# Patient Record
Sex: FEMALE | Race: WHITE | NOT HISPANIC OR LATINO | Employment: OTHER | URBAN - METROPOLITAN AREA
[De-identification: names, ages, dates, MRNs, and addresses within clinical notes are randomized per-mention and may not be internally consistent; named-entity substitution may affect disease eponyms.]

---

## 2021-10-06 PROBLEM — R01.1 HEART MURMUR: Status: ACTIVE | Noted: 2021-10-06

## 2021-10-06 PROBLEM — R60.9 EDEMA: Status: ACTIVE | Noted: 2021-10-06

## 2021-10-06 PROBLEM — E78.49 OTHER HYPERLIPIDEMIA: Status: ACTIVE | Noted: 2021-10-06

## 2021-10-06 PROBLEM — E11.9 TYPE 2 DIABETES MELLITUS WITHOUT COMPLICATION, WITHOUT LONG-TERM CURRENT USE OF INSULIN: Status: ACTIVE | Noted: 2021-10-06

## 2021-10-06 PROBLEM — R06.09 DYSPNEA ON EXERTION: Status: ACTIVE | Noted: 2021-10-06

## 2021-10-06 PROBLEM — I10 HYPERTENSION: Status: ACTIVE | Noted: 2021-10-06

## 2021-10-06 PROBLEM — E66.09 CLASS 2 OBESITY DUE TO EXCESS CALORIES WITHOUT SERIOUS COMORBIDITY WITH BODY MASS INDEX (BMI) OF 35.0 TO 35.9 IN ADULT: Status: ACTIVE | Noted: 2021-10-06

## 2021-10-06 PROBLEM — I10 HYPERTENSION: Status: RESOLVED | Noted: 2021-10-06 | Resolved: 2021-10-06

## 2022-01-24 PROBLEM — I34.0 NONRHEUMATIC MITRAL VALVE REGURGITATION: Status: ACTIVE | Noted: 2022-01-24

## 2022-01-24 PROBLEM — I35.1 NONRHEUMATIC AORTIC VALVE INSUFFICIENCY: Status: ACTIVE | Noted: 2022-01-24

## 2022-01-24 PROBLEM — I51.89 DIASTOLIC DYSFUNCTION: Status: ACTIVE | Noted: 2022-01-24

## 2022-01-24 PROBLEM — I70.0 AORTIC ATHEROSCLEROSIS: Status: ACTIVE | Noted: 2022-01-24

## 2022-01-24 PROBLEM — R01.1 HEART MURMUR: Status: RESOLVED | Noted: 2021-10-06 | Resolved: 2022-01-24

## 2022-01-24 PROBLEM — I51.7 LVH (LEFT VENTRICULAR HYPERTROPHY): Status: ACTIVE | Noted: 2022-01-24

## 2022-01-24 PROBLEM — I51.7 LEFT ATRIAL ENLARGEMENT: Status: ACTIVE | Noted: 2022-01-24

## 2022-01-24 PROBLEM — R94.39 ABNORMAL CARDIOVASCULAR STRESS TEST: Status: ACTIVE | Noted: 2022-01-24

## 2022-01-24 PROBLEM — I10 PRIMARY HYPERTENSION: Status: ACTIVE | Noted: 2022-01-24

## 2022-01-24 PROBLEM — I50.20 HEART FAILURE WITH REDUCED EJECTION FRACTION: Status: ACTIVE | Noted: 2022-01-24

## 2022-01-24 PROBLEM — I51.7 LEFT VENTRICULAR ENLARGEMENT: Status: ACTIVE | Noted: 2022-01-24

## 2022-01-24 PROBLEM — I42.9 CARDIOMYOPATHY: Status: ACTIVE | Noted: 2022-01-24

## 2022-01-24 PROBLEM — J98.4 PULMONARY INSUFFICIENCY: Status: ACTIVE | Noted: 2022-01-24

## 2022-05-25 ENCOUNTER — TELEPHONE (OUTPATIENT)
Dept: TRANSPLANT | Facility: CLINIC | Age: 67
End: 2022-05-25
Payer: COMMERCIAL

## 2022-05-25 DIAGNOSIS — I50.9 CONGESTIVE HEART FAILURE, UNSPECIFIED HF CHRONICITY, UNSPECIFIED HEART FAILURE TYPE: Primary | ICD-10-CM

## 2022-06-07 ENCOUNTER — PATIENT MESSAGE (OUTPATIENT)
Dept: TRANSPLANT | Facility: CLINIC | Age: 67
End: 2022-06-07
Payer: COMMERCIAL

## 2022-06-14 ENCOUNTER — HOSPITAL ENCOUNTER (OUTPATIENT)
Dept: PULMONOLOGY | Facility: CLINIC | Age: 67
Discharge: HOME | End: 2022-06-14
Payer: COMMERCIAL

## 2022-06-14 ENCOUNTER — LAB VISIT (OUTPATIENT)
Dept: LAB | Facility: HOSPITAL | Age: 67
End: 2022-06-14
Attending: INTERNAL MEDICINE
Payer: COMMERCIAL

## 2022-06-14 ENCOUNTER — OFFICE VISIT (OUTPATIENT)
Dept: TRANSPLANT | Facility: CLINIC | Age: 67
End: 2022-06-14
Payer: COMMERCIAL

## 2022-06-14 VITALS
HEIGHT: 64 IN | DIASTOLIC BLOOD PRESSURE: 63 MMHG | BODY MASS INDEX: 31.42 KG/M2 | SYSTOLIC BLOOD PRESSURE: 108 MMHG | WEIGHT: 184.06 LBS | HEART RATE: 93 BPM

## 2022-06-14 VITALS — BODY MASS INDEX: 31.58 KG/M2 | HEIGHT: 64 IN | WEIGHT: 185 LBS

## 2022-06-14 DIAGNOSIS — I70.0 AORTIC ATHEROSCLEROSIS: ICD-10-CM

## 2022-06-14 DIAGNOSIS — I50.9 CONGESTIVE HEART FAILURE, UNSPECIFIED HF CHRONICITY, UNSPECIFIED HEART FAILURE TYPE: ICD-10-CM

## 2022-06-14 DIAGNOSIS — I50.42 CHRONIC COMBINED SYSTOLIC (CONGESTIVE) AND DIASTOLIC (CONGESTIVE) HEART FAILURE: Primary | ICD-10-CM

## 2022-06-14 DIAGNOSIS — E66.09 CLASS 1 OBESITY DUE TO EXCESS CALORIES WITHOUT SERIOUS COMORBIDITY WITH BODY MASS INDEX (BMI) OF 31.0 TO 31.9 IN ADULT: ICD-10-CM

## 2022-06-14 DIAGNOSIS — E11.9 TYPE 2 DIABETES MELLITUS WITHOUT COMPLICATION, WITHOUT LONG-TERM CURRENT USE OF INSULIN: ICD-10-CM

## 2022-06-14 DIAGNOSIS — I25.5 ISCHEMIC CARDIOMYOPATHY: ICD-10-CM

## 2022-06-14 DIAGNOSIS — E78.49 OTHER HYPERLIPIDEMIA: ICD-10-CM

## 2022-06-14 PROBLEM — E66.9 CLASS 1 OBESITY IN ADULT: Status: ACTIVE | Noted: 2021-10-06

## 2022-06-14 LAB
ALBUMIN SERPL BCP-MCNC: 3.3 G/DL (ref 3.5–5.2)
ALP SERPL-CCNC: 71 U/L (ref 55–135)
ALT SERPL W/O P-5'-P-CCNC: 15 U/L (ref 10–44)
ANION GAP SERPL CALC-SCNC: 16 MMOL/L (ref 8–16)
AST SERPL-CCNC: 13 U/L (ref 10–40)
BASOPHILS # BLD AUTO: 0.06 K/UL (ref 0–0.2)
BASOPHILS NFR BLD: 0.6 % (ref 0–1.9)
BILIRUB SERPL-MCNC: 0.8 MG/DL (ref 0.1–1)
BNP SERPL-MCNC: 284 PG/ML (ref 0–99)
BUN SERPL-MCNC: 24 MG/DL (ref 8–23)
CALCIUM SERPL-MCNC: 9.4 MG/DL (ref 8.7–10.5)
CHLORIDE SERPL-SCNC: 100 MMOL/L (ref 95–110)
CO2 SERPL-SCNC: 22 MMOL/L (ref 23–29)
CREAT SERPL-MCNC: 1 MG/DL (ref 0.5–1.4)
DIFFERENTIAL METHOD: ABNORMAL
EOSINOPHIL # BLD AUTO: 0.1 K/UL (ref 0–0.5)
EOSINOPHIL NFR BLD: 1.3 % (ref 0–8)
ERYTHROCYTE [DISTWIDTH] IN BLOOD BY AUTOMATED COUNT: 13.2 % (ref 11.5–14.5)
EST. GFR  (AFRICAN AMERICAN): >60 ML/MIN/1.73 M^2
EST. GFR  (NON AFRICAN AMERICAN): 59.7 ML/MIN/1.73 M^2
GLUCOSE SERPL-MCNC: 152 MG/DL (ref 70–110)
HCT VFR BLD AUTO: 42.1 % (ref 37–48.5)
HGB BLD-MCNC: 14.3 G/DL (ref 12–16)
IMM GRANULOCYTES # BLD AUTO: 0.03 K/UL (ref 0–0.04)
IMM GRANULOCYTES NFR BLD AUTO: 0.3 % (ref 0–0.5)
LYMPHOCYTES # BLD AUTO: 1.8 K/UL (ref 1–4.8)
LYMPHOCYTES NFR BLD: 18.2 % (ref 18–48)
MCH RBC QN AUTO: 29.7 PG (ref 27–31)
MCHC RBC AUTO-ENTMCNC: 34 G/DL (ref 32–36)
MCV RBC AUTO: 88 FL (ref 82–98)
MONOCYTES # BLD AUTO: 0.4 K/UL (ref 0.3–1)
MONOCYTES NFR BLD: 4.5 % (ref 4–15)
NEUTROPHILS # BLD AUTO: 7.3 K/UL (ref 1.8–7.7)
NEUTROPHILS NFR BLD: 75.1 % (ref 38–73)
NRBC BLD-RTO: 0 /100 WBC
PLATELET # BLD AUTO: 293 K/UL (ref 150–450)
PMV BLD AUTO: 10.4 FL (ref 9.2–12.9)
POTASSIUM SERPL-SCNC: 3.4 MMOL/L (ref 3.5–5.1)
PROT SERPL-MCNC: 7.9 G/DL (ref 6–8.4)
RBC # BLD AUTO: 4.81 M/UL (ref 4–5.4)
SODIUM SERPL-SCNC: 138 MMOL/L (ref 136–145)
TSH SERPL DL<=0.005 MIU/L-ACNC: 1.11 UIU/ML (ref 0.4–4)
WBC # BLD AUTO: 9.75 K/UL (ref 3.9–12.7)

## 2022-06-14 PROCEDURE — 99999 PR PBB SHADOW E&M-EST. PATIENT-LVL V: CPT | Mod: PBBFAC,TXP,, | Performed by: INTERNAL MEDICINE

## 2022-06-14 PROCEDURE — 4010F PR ACE/ARB THEARPY RXD/TAKEN: ICD-10-PCS | Mod: CPTII,S$GLB,TXP, | Performed by: INTERNAL MEDICINE

## 2022-06-14 PROCEDURE — 1159F PR MEDICATION LIST DOCUMENTED IN MEDICAL RECORD: ICD-10-PCS | Mod: CPTII,S$GLB,TXP, | Performed by: INTERNAL MEDICINE

## 2022-06-14 PROCEDURE — 94618 PULMONARY STRESS TESTING: ICD-10-PCS | Mod: NTX,S$GLB,, | Performed by: INTERNAL MEDICINE

## 2022-06-14 PROCEDURE — 1159F MED LIST DOCD IN RCRD: CPT | Mod: CPTII,S$GLB,TXP, | Performed by: INTERNAL MEDICINE

## 2022-06-14 PROCEDURE — 83880 ASSAY OF NATRIURETIC PEPTIDE: CPT | Mod: TXP | Performed by: INTERNAL MEDICINE

## 2022-06-14 PROCEDURE — 3008F PR BODY MASS INDEX (BMI) DOCUMENTED: ICD-10-PCS | Mod: CPTII,S$GLB,TXP, | Performed by: INTERNAL MEDICINE

## 2022-06-14 PROCEDURE — 3051F PR MOST RECENT HEMOGLOBIN A1C LEVEL 7.0 - < 8.0%: ICD-10-PCS | Mod: CPTII,S$GLB,TXP, | Performed by: INTERNAL MEDICINE

## 2022-06-14 PROCEDURE — 85025 COMPLETE CBC W/AUTO DIFF WBC: CPT | Mod: TXP | Performed by: INTERNAL MEDICINE

## 2022-06-14 PROCEDURE — 4010F ACE/ARB THERAPY RXD/TAKEN: CPT | Mod: CPTII,S$GLB,TXP, | Performed by: INTERNAL MEDICINE

## 2022-06-14 PROCEDURE — 94618 PULMONARY STRESS TESTING: CPT | Mod: NTX,S$GLB,, | Performed by: INTERNAL MEDICINE

## 2022-06-14 PROCEDURE — 99204 PR OFFICE/OUTPT VISIT, NEW, LEVL IV, 45-59 MIN: ICD-10-PCS | Mod: S$GLB,TXP,, | Performed by: INTERNAL MEDICINE

## 2022-06-14 PROCEDURE — 99204 OFFICE O/P NEW MOD 45 MIN: CPT | Mod: S$GLB,TXP,, | Performed by: INTERNAL MEDICINE

## 2022-06-14 PROCEDURE — 3008F BODY MASS INDEX DOCD: CPT | Mod: CPTII,S$GLB,TXP, | Performed by: INTERNAL MEDICINE

## 2022-06-14 PROCEDURE — 80053 COMPREHEN METABOLIC PANEL: CPT | Mod: TXP | Performed by: INTERNAL MEDICINE

## 2022-06-14 PROCEDURE — 36415 COLL VENOUS BLD VENIPUNCTURE: CPT | Mod: TXP | Performed by: INTERNAL MEDICINE

## 2022-06-14 PROCEDURE — 3078F PR MOST RECENT DIASTOLIC BLOOD PRESSURE < 80 MM HG: ICD-10-PCS | Mod: CPTII,S$GLB,TXP, | Performed by: INTERNAL MEDICINE

## 2022-06-14 PROCEDURE — 1160F RVW MEDS BY RX/DR IN RCRD: CPT | Mod: CPTII,S$GLB,TXP, | Performed by: INTERNAL MEDICINE

## 2022-06-14 PROCEDURE — 3078F DIAST BP <80 MM HG: CPT | Mod: CPTII,S$GLB,TXP, | Performed by: INTERNAL MEDICINE

## 2022-06-14 PROCEDURE — 3074F PR MOST RECENT SYSTOLIC BLOOD PRESSURE < 130 MM HG: ICD-10-PCS | Mod: CPTII,S$GLB,TXP, | Performed by: INTERNAL MEDICINE

## 2022-06-14 PROCEDURE — 1160F PR REVIEW ALL MEDS BY PRESCRIBER/CLIN PHARMACIST DOCUMENTED: ICD-10-PCS | Mod: CPTII,S$GLB,TXP, | Performed by: INTERNAL MEDICINE

## 2022-06-14 PROCEDURE — 3074F SYST BP LT 130 MM HG: CPT | Mod: CPTII,S$GLB,TXP, | Performed by: INTERNAL MEDICINE

## 2022-06-14 PROCEDURE — 3051F HG A1C>EQUAL 7.0%<8.0%: CPT | Mod: CPTII,S$GLB,TXP, | Performed by: INTERNAL MEDICINE

## 2022-06-14 PROCEDURE — 99999 PR PBB SHADOW E&M-EST. PATIENT-LVL V: ICD-10-PCS | Mod: PBBFAC,TXP,, | Performed by: INTERNAL MEDICINE

## 2022-06-14 PROCEDURE — 84443 ASSAY THYROID STIM HORMONE: CPT | Mod: TXP | Performed by: INTERNAL MEDICINE

## 2022-06-14 RX ORDER — ALPRAZOLAM 0.25 MG/1
TABLET ORAL
COMMUNITY
End: 2022-11-15

## 2022-06-14 RX ORDER — SACUBITRIL AND VALSARTAN 24; 26 MG/1; MG/1
2 TABLET, FILM COATED ORAL 2 TIMES DAILY
Qty: 180 TABLET | Refills: 3 | Status: SHIPPED | OUTPATIENT
Start: 2022-06-14 | End: 2022-08-09

## 2022-06-14 NOTE — PROGRESS NOTES
ADVANCED HEART FAILURE AND TRANSPLANTATION CONSULTATION    REFERRING PHYSICIAN:  Maciel Forbes MD  [address] [place] [address]      CHIEF COMPLAINT:  Evaluation of management of heart failure and consideration of advanced cardiac therapies    HISTORY OF PRESENT ILLNESS:  Lili Gerardo is a 66 y.o. female with a past medical history remarkable for multivessel coronary artery disease, ischemic cardiomyopathy/HFrEF presenting to [facility] for consideration of advanced therapies.     Co-morbidities: HTN for over 20 years, DM2 for about 10 years not on insulin, hyperlipidemia, obesity BMI 31.6    States she was born with heart issues. Her mother had taken her to follow-ups at the age of 3 with concern for possible pulmonary stenosis. Saw Dr. Ellington locally at age 14 with no issues and no intervention or medications.     Diagnosed with coronary artery disease when she presented with an MI (appears to be inferior STEMI by ECG 1/29/2022 with symptoms of shortness of breath. She was admitted and underwent coronary angiography 2/3/2022 noting 30-40% LM, 90%prox LAD occluded mid with distal collateral filling, D1 80-90%, 95% CX, 95% OM1, RCA occluded proximally with large RV branch giving collaterals to mid-distal LAD. She did undergo balloon angioplasty to the CX, and YESICA to OM1 and unable to intervene on LAD noted as . She was told she was not a candidate for bypass surgery locally.     Since discharge, she has been following locally and placed on LifeVest. She is doing much better. She is accompanied by her daughter for today's visit. Notes no significant SOB but notes more fatigue and issues with knees. She is able to go upstairs to her granddaughter's two story house without SOB. Prior to admission she was struggling with SOB. Before presentation she had noticed a few months of shortness of breath. Notes occasional nausea. Denies orthopnea, PND, bendopnea, abdominal distension, early  satiety, lower extremity edema, chest discomfort, presyncope, palpitations, or syncope. Notes skin bruising but otherwise denies adverse bleeding, no hematochezia/melena/hematuria/hematemesis. Notes occasional cough that has been longstanding. Notes some seasonal allergies as well. Has associated rhinorrhea. Cough drops help with this.      6MWT today ambulated 244 meters with fatigue but did not stop and with no desaturations. Does cardiac rehab twice weekly. Follows with local Endocrinology. Does note checking daily weights but not told regarding fluid restriction. Follows salt restriction. She tries to take care of things around the house and baby sits her 3.5 year old granddaugther. She does basic housework.     Current diuretic regimen: Lasix 40 mg daily    GDMT: Toprol 75 mg BID, Entresto 24-26 mg BID, Aldactone 25 mg daily, Jardiance 25 mg daily    Cardiac history:  Angina: + SOB  Myocardial infarction: +  Revascularization: +  CVA/TIA: neg  VTE: neg  AF/arrhythmias: neg  Obesity: +  Hyperlipidemia: +  DM: +  PAD: no per OSH testing    Cardiac Data: 5/6/2022 OSH: LVEDD 53 mm, EF 20-25% with RWMA and apical/inferior akinesis, normal RV size and function, mild MR, trace AR  Transthoracic Echo:  Results for orders placed during the hospital encounter of 11/08/21  · The left ventricle is severely enlarged with severe eccentric hypertrophy and severely decreased systolic function.  · The estimated ejection fraction is about 25%.  · The quantitatively derived ejection fraction is 24%.  · There are segmental left ventricular wall motion abnormalities.  · Left ventricular diastolic dysfunction.  · Severe left atrial enlargement.  · Normal right ventricular size with normal right ventricular systolic function.  · Plaque present in the ascending aorta.  · Mild aortic regurgitation.  · Mild mitral regurgitation.  · The estimated PA systolic pressure is 16 mmHg.  · There is no pulmonary hypertension.  · Moderate pulmonic  regurgitation.  · Normal central venous pressure (3 mmHg).  · Trivial pericardial effusion.    Nuclear PET stress 12/10/2021:    Abnormal myocardial perfusion scan.    Evidence of edy-infarct ischemia in the apex, mid AW and the LW; also, definite very large myocardial injury (scar) by nuclear medicine imaging in the IS, IW, ILW, LW, mid-distal AW and the apex. Only the AS and the ALW are well perfused.    The EKG portion of this study is negative for ischemia.    The patient reported no chest pain during the stress test.    There were no arrhythmias during stress.    There are two significant perfusion abnormalities.    The gated perfusion images showed an ejection fraction of 22% post stress.    Defect #1 - There is a severe intensity, very large sized, fixed defect consistent with scar in the basal to apical inferior, inferolateral, inferoseptal and lateral wall(s).    Defect #2 - There is a large sized, severe intensity, fixed defect consistent with scar  in the mid to apical anterior wall and in the apex wall(s).    ECG 1/9/2022:  bpm with ST elevation inferiorly and ST depressions high lateral leads    Left Heart Catheterization: above    Right Heart Catheterization: none    Devices: LifeVest, plans for ICD in August 2022 after Medicare    PAST MEDICAL HISTORY:  Past Medical History:   Diagnosis Date    Diabetes     Heart attack     01/2022    Heart murmur     Hypertension        PAST SURGICAL HISTORY:  Past Surgical History:   Procedure Laterality Date    GALLBLADDER SURGERY         SOCIAL HISTORY  Marital Status: single, 3 kids all with HTN  Occupation: retired, used to work as   Alcohol: never  Tobacco: in teens briefly  Marijuana: never  IV Drug Use: neg  Cocaine/meth: neg  Hx preeclampsia: none besides gestational HTN for one pregnancy    FAMILY HISTORY  No family history of early CAD or HF  Father with DM2 with complications  Mother with open heart surgery years ago  possibly CABG  No SCD  First cousin with ICD for unclear reason    ALLERGIES:  Review of patient's allergies indicates:  No Known Allergies    MEDICATIONS  Current Outpatient Medications on File Prior to Visit   Medication Sig Dispense Refill    ALPRAZolam (XANAX) 0.25 MG tablet Take by mouth as needed for Anxiety.      aspirin (ECOTRIN) 81 MG EC tablet Adult Low Dose Aspirin 81 mg tablet,delayed release   Take 1 tablet every day by oral route.      atorvastatin (LIPITOR) 80 MG tablet Take 80 mg by mouth once daily.      blood sugar diagnostic Strp 1 strip by Misc.(Non-Drug; Combo Route) route once daily at 6am. True metrix test strips 100 strip 3    BRILINTA 90 mg tablet       empagliflozin (JARDIANCE) 25 mg tablet Take 1 tablet (25 mg total) by mouth once daily. 90 tablet 3    ENTRESTO 24-26 mg per tablet Take 1 tablet by mouth 2 (two) times daily. 180 tablet 3    ergocalciferol (ERGOCALCIFEROL) 50,000 unit Cap Take 1 capsule (50,000 Units total) by mouth every 7 days. 12 capsule 1    furosemide (LASIX) 40 MG tablet Take 40 mg by mouth once daily.      lancets 32 gauge Misc 1 lancet by Misc.(Non-Drug; Combo Route) route 3 (three) times daily. 200 each 3    metFORMIN (GLUCOPHAGE) 500 MG tablet Take 1 tablet (500 mg total) by mouth once daily. Take every evening 90 tablet 3    metoprolol succinate (TOPROL-XL) 50 MG 24 hr tablet TAKE 1 & 1/2 (ONE & ONE-HALF) TABLETS BY MOUTH TWICE DAILY      pantoprazole (PROTONIX) 40 MG tablet Take 40 mg by mouth once daily.      ranolazine (RANEXA) 500 MG Tb12 Take 500 mg by mouth 2 (two) times daily.      SITagliptin (JANUVIA) 100 MG Tab Take 1 tablet (100 mg total) by mouth once daily. 30 tablet 11    spironolactone (ALDACTONE) 25 MG tablet Take 1 tablet (25 mg total) by mouth once daily. 90 tablet 3    [DISCONTINUED] cloNIDine (CATAPRES) 0.2 MG tablet Take 0.5 tablets (0.1 mg total) by mouth every evening. (Patient not taking: Reported on 5/3/2022) 90 tablet 3  "   [DISCONTINUED] empagliflozin (JARDIANCE) 25 mg tablet Take 1 tablet (25 mg total) by mouth once daily. 30 tablet 1    [DISCONTINUED] lisinopriL (PRINIVIL,ZESTRIL) 20 MG tablet Take 1 tablet (20 mg total) by mouth every evening. 30 tablet 11    [DISCONTINUED] SITagliptin (JANUVIA) 100 MG Tab Take 1 tablet (100 mg total) by mouth once daily. 90 tablet 3     No current facility-administered medications on file prior to visit.       REVIEW OF SYSTEMS  Review of Systems   Constitutional: Negative for activity change, appetite change and fatigue.   Respiratory: Positive for shortness of breath. Negative for chest tightness.    Cardiovascular: Negative for chest pain and leg swelling.   Gastrointestinal: Negative for abdominal distention, blood in stool and nausea.   Genitourinary: Negative for difficulty urinating and hematuria.   Neurological: Negative for syncope and light-headedness.   Hematological: Does not bruise/bleed easily.   All other systems reviewed and are negative.      PHYSICAL EXAM:    Vitals:    06/14/22 1305 06/14/22 1308   BP: 119/68 108/63   BP Location: Left arm Left arm   Patient Position: Sitting Standing   BP Method: Medium (Automatic) Medium (Automatic)   Pulse: 86 93   Weight: 83.5 kg (184 lb 1.4 oz)    Height: 5' 4" (1.626 m)     Body mass index is 31.6 kg/m².    GENERAL: Alert, NAD   HEENT: Anicteric sclerae  NECK: JVP not visible above level of clavicle while sitting upright or reclining 45 degrees  CARDIOVASCULAR: Regular rate and rhythm. Normal S1, S2 no murmurs, rubs or gallops.  PULMONARY: Lungs clear to auscultation bilaterally  ABDOMEN: Soft, nontender, nondistended. No guarding, no rebound, no hepatomegaly  EXTREMITIES: Warm and well-perfused. No clubbing, cyanosis or edema. No pre-sacral edema  VASCULAR: 2+ bilateral radial pulses  NEUROLOGIC: No focal deficits    LABS:    BMP  Lab Results   Component Value Date     06/14/2022    K 3.4 (L) 06/14/2022     06/14/2022    " CO2 22 (L) 06/14/2022    BUN 24 (H) 06/14/2022    CREATININE 1.0 06/14/2022    CALCIUM 9.4 06/14/2022    ANIONGAP 16 06/14/2022    ESTGFRAFRICA >60.0 06/14/2022    EGFRNONAA 59.7 (A) 06/14/2022       Magnesium   Date Value Ref Range Status   01/24/2022 2.1 1.6 - 2.6 mg/dL Final       Lab Results   Component Value Date    WBC 9.75 06/14/2022    HGB 14.3 06/14/2022    HCT 42.1 06/14/2022    MCV 88 06/14/2022     06/14/2022         Lab Results   Component Value Date    INR 1.1 01/29/2022    INR 1.0 01/24/2022       BNP   Date Value Ref Range Status   06/14/2022 284 (H) 0 - 99 pg/mL Final     Comment:     Values of less than 100 pg/ml are consistent with non-CHF populations.   01/29/2022 1,067 (H) 0 - 99 pg/mL Final     Comment:     Values of less than 100 pg/ml are consistent with non-CHF populations.   01/24/2022 1,132 (H) 0 - 99 pg/mL Final     Comment:     Values of less than 100 pg/ml are consistent with non-CHF populations.         IMPRESSION:    NYHA Class II  ACC/AHA Stage C  Costa profile A    1. Chronic combined systolic (congestive) and diastolic (congestive) heart failure    2. Ischemic cardiomyopathy    3. Aortic atherosclerosis    4. Other hyperlipidemia    5. Type 2 diabetes mellitus without complication, without long-term current use of insulin    6. Class 1 obesity due to excess calories without serious comorbidity with body mass index (BMI) of 31.0 to 31.9 in adult        PLAN:    Would prefer to review cath films with interventional cardiology and cardiothoracic surgery as well as repeat viability testing (PET stress done prior to STEMI) for consideration of revascularization.     In interim, increase Entresto to 49-51 mg BID with repeat labs locally next week.    Recommend 2 gram sodium restriction and 1500 mL fluid restriction.  Encourage physical activity with graded exercise program.  Requested patient to weigh themselves daily, and to notify us if their weight increases by more than 3  lbs in 1 day or 5 lbs in 1 week.     Pt to call us with more shortness of breath, swelling or unexpected weight changes, fever, chills, bloody or black bowel movements, or other concerns.    F/U 1 month with labs    Electronically signed by:   Mahogany Arias   06/14/2022 11:52 AM

## 2022-06-14 NOTE — PATIENT INSTRUCTIONS
Recommend 2 gram sodium restriction and 1500cc fluid restriction.  Encourage physical activity with graded exercise program.  Requested patient to weigh themselves daily, and to notify us if their weight increases by more than 3 lbs in 1 day or 5 lbs in 1 week.      We will increase your Entresto to 49-51 mg twice daily so until your new prescription comes, take 2 tablets in the AM and 2 tablets in the PM. With this change, we will repeat blood work locally next week to follow your potassium and kidney function levels.

## 2022-06-14 NOTE — LETTER
June 14, 2022        Maciel Forbes  [address] [facility]  [place] [address]  Phone: [phone]  Fax: [phone]             [facility]  [address]  [facility] [address]  Phone: [phone]   Patient: Lili Gerardo   MR Number: [id number]   YOB: 1955   Date of Visit: 6/14/2022       Dear Dr. Maciel Forbes    Thank you for referring Lili Gerardo to me for evaluation. Attached you will find relevant portions of my assessment and plan of care.    If you have questions, please do not hesitate to call me. I look forward to following Lili Gerardo along with you.    Sincerely,    Mahogany Arias, DO    Enclosure    If you would like to receive this communication electronically, please contact [email or web address] or [phone] to request [employer] access.    [employer] is a tool which provides read-only access to select patient information with whom you have a relationship. Its easy to use and provides real time access to review your patients record including encounter summaries, notes, results, and demographic information.    If you feel you have received this communication in error or would no longer like to receive these types of communications, please e-mail [email or web address]

## 2022-06-17 NOTE — PROCEDURES
Lili Gerardo is a 66 y.o.  female patient, who presents for a 6 minute walk test ordered by DO Mario.  The diagnosis is Shortness of Breath.  The patient's BMI is 31.7 kg/m2.  Predicted distance (lower limit of normal) is 307.07 meters.      Test Results:    The test was completed without stopping. The total time walked was 360 seconds.  During walking, the patient reported:  Other (Comment) (Fatigue). The patient used no assistive devices  during testing.     6/14/2022---------Distance: 243.84 meters (800 feet)     O2 Sat % Supplemental Oxygen Heart Rate Blood Pressure Daina Scale   Pre-exercise  (Resting) 98 % Room Air 84 bpm 117/58 mmHg 0   During Exercise 98 % Room Air 97 bpm 143/80 mmHg 1   Post-exercise  (Recovery) 98 % Room Air  86 bpm 124/67 mmHg       Recovery Time: 128 seconds    Performing nurse/tech: Kiersten LONGO      PREVIOUS STUDY:   The patient has not had a previous study.      CLINICAL INTERPRETATION:  Six minute walk distance is 243.84 meters (800 feet) with very light dyspnea.  During exercise, there was no significant desaturation while breathing room air.  Both blood pressure and heart rate remained stable with walking.  The patient reported non-pulmonary symptoms during exercise.  Significant exercise impairment is likely due to subjective symptoms.  No previous study performed.  Based upon age and body mass index, exercise capacity is less than predicted.

## 2022-06-21 ENCOUNTER — TELEPHONE (OUTPATIENT)
Dept: TRANSPLANT | Facility: CLINIC | Age: 67
End: 2022-06-21
Payer: COMMERCIAL

## 2022-06-21 LAB
EXT BNP: 158
EXT BUN: 26
EXT CALCIUM: 9.2
EXT CHLORIDE: 105
EXT CREATININE: 0.86 MG/DL
EXT GLUCOSE: 151
EXT POTASSIUM: 3.9
EXT SODIUM: 138 MMOL/L

## 2022-06-21 NOTE — TELEPHONE ENCOUNTER
Received faxed labs dated 06/21/2022. results are consistent with recent trends.       Creat 0.86  K 3.9  BNP  158    Spoke with pt who reports she feels mostly well since increasing her Entresto with the exception of an increase in her cough. Weight has been stable, no edema noted.     Results and note routed to Dr Arias for review.

## 2022-08-09 ENCOUNTER — OFFICE VISIT (OUTPATIENT)
Dept: TRANSPLANT | Facility: CLINIC | Age: 67
End: 2022-08-09
Payer: MEDICARE

## 2022-08-09 ENCOUNTER — LAB VISIT (OUTPATIENT)
Dept: LAB | Facility: HOSPITAL | Age: 67
End: 2022-08-09
Attending: INTERNAL MEDICINE
Payer: MEDICARE

## 2022-08-09 VITALS
BODY MASS INDEX: 31.69 KG/M2 | DIASTOLIC BLOOD PRESSURE: 59 MMHG | HEART RATE: 78 BPM | HEIGHT: 64 IN | WEIGHT: 185.63 LBS | SYSTOLIC BLOOD PRESSURE: 110 MMHG

## 2022-08-09 DIAGNOSIS — I70.0 AORTIC ATHEROSCLEROSIS: ICD-10-CM

## 2022-08-09 DIAGNOSIS — E11.9 TYPE 2 DIABETES MELLITUS WITHOUT COMPLICATION, WITHOUT LONG-TERM CURRENT USE OF INSULIN: ICD-10-CM

## 2022-08-09 DIAGNOSIS — I25.5 ISCHEMIC CARDIOMYOPATHY: ICD-10-CM

## 2022-08-09 DIAGNOSIS — I10 PRIMARY HYPERTENSION: ICD-10-CM

## 2022-08-09 DIAGNOSIS — I25.5 ISCHEMIC CARDIOMYOPATHY: Primary | ICD-10-CM

## 2022-08-09 DIAGNOSIS — I50.42 CHRONIC COMBINED SYSTOLIC (CONGESTIVE) AND DIASTOLIC (CONGESTIVE) HEART FAILURE: ICD-10-CM

## 2022-08-09 DIAGNOSIS — I50.20 HEART FAILURE WITH REDUCED EJECTION FRACTION: ICD-10-CM

## 2022-08-09 DIAGNOSIS — R94.39 ABNORMAL CARDIOVASCULAR STRESS TEST: ICD-10-CM

## 2022-08-09 DIAGNOSIS — E78.49 OTHER HYPERLIPIDEMIA: ICD-10-CM

## 2022-08-09 DIAGNOSIS — E66.09 CLASS 1 OBESITY DUE TO EXCESS CALORIES WITHOUT SERIOUS COMORBIDITY WITH BODY MASS INDEX (BMI) OF 31.0 TO 31.9 IN ADULT: ICD-10-CM

## 2022-08-09 LAB
ALBUMIN SERPL BCP-MCNC: 3.3 G/DL (ref 3.5–5.2)
ALP SERPL-CCNC: 68 U/L (ref 55–135)
ALT SERPL W/O P-5'-P-CCNC: 14 U/L (ref 10–44)
ANION GAP SERPL CALC-SCNC: 12 MMOL/L (ref 8–16)
AST SERPL-CCNC: 12 U/L (ref 10–40)
BASOPHILS # BLD AUTO: 0.06 K/UL (ref 0–0.2)
BASOPHILS NFR BLD: 0.7 % (ref 0–1.9)
BILIRUB SERPL-MCNC: 0.6 MG/DL (ref 0.1–1)
BNP SERPL-MCNC: 148 PG/ML (ref 0–99)
BUN SERPL-MCNC: 28 MG/DL (ref 8–23)
CALCIUM SERPL-MCNC: 10.1 MG/DL (ref 8.7–10.5)
CHLORIDE SERPL-SCNC: 101 MMOL/L (ref 95–110)
CO2 SERPL-SCNC: 27 MMOL/L (ref 23–29)
CREAT SERPL-MCNC: 1 MG/DL (ref 0.5–1.4)
DIFFERENTIAL METHOD: NORMAL
EOSINOPHIL # BLD AUTO: 0.2 K/UL (ref 0–0.5)
EOSINOPHIL NFR BLD: 2 % (ref 0–8)
ERYTHROCYTE [DISTWIDTH] IN BLOOD BY AUTOMATED COUNT: 13 % (ref 11.5–14.5)
EST. GFR  (NO RACE VARIABLE): >60 ML/MIN/1.73 M^2
GLUCOSE SERPL-MCNC: 143 MG/DL (ref 70–110)
HCT VFR BLD AUTO: 42 % (ref 37–48.5)
HGB BLD-MCNC: 13.9 G/DL (ref 12–16)
IMM GRANULOCYTES # BLD AUTO: 0.03 K/UL (ref 0–0.04)
IMM GRANULOCYTES NFR BLD AUTO: 0.3 % (ref 0–0.5)
LYMPHOCYTES # BLD AUTO: 2.2 K/UL (ref 1–4.8)
LYMPHOCYTES NFR BLD: 25.2 % (ref 18–48)
MAGNESIUM SERPL-MCNC: 1.9 MG/DL (ref 1.6–2.6)
MCH RBC QN AUTO: 30 PG (ref 27–31)
MCHC RBC AUTO-ENTMCNC: 33.1 G/DL (ref 32–36)
MCV RBC AUTO: 91 FL (ref 82–98)
MONOCYTES # BLD AUTO: 0.6 K/UL (ref 0.3–1)
MONOCYTES NFR BLD: 6.6 % (ref 4–15)
NEUTROPHILS # BLD AUTO: 5.8 K/UL (ref 1.8–7.7)
NEUTROPHILS NFR BLD: 65.2 % (ref 38–73)
NRBC BLD-RTO: 0 /100 WBC
PLATELET # BLD AUTO: 267 K/UL (ref 150–450)
PMV BLD AUTO: 10.2 FL (ref 9.2–12.9)
POTASSIUM SERPL-SCNC: 3.8 MMOL/L (ref 3.5–5.1)
PROT SERPL-MCNC: 7.6 G/DL (ref 6–8.4)
RBC # BLD AUTO: 4.64 M/UL (ref 4–5.4)
SODIUM SERPL-SCNC: 140 MMOL/L (ref 136–145)
WBC # BLD AUTO: 8.84 K/UL (ref 3.9–12.7)

## 2022-08-09 PROCEDURE — 85025 COMPLETE CBC W/AUTO DIFF WBC: CPT | Mod: TXP | Performed by: INTERNAL MEDICINE

## 2022-08-09 PROCEDURE — 80053 COMPREHEN METABOLIC PANEL: CPT | Mod: TXP | Performed by: INTERNAL MEDICINE

## 2022-08-09 PROCEDURE — 99214 OFFICE O/P EST MOD 30 MIN: CPT | Mod: S$PBB,NTX,, | Performed by: INTERNAL MEDICINE

## 2022-08-09 PROCEDURE — 83880 ASSAY OF NATRIURETIC PEPTIDE: CPT | Mod: NTX | Performed by: INTERNAL MEDICINE

## 2022-08-09 PROCEDURE — 99214 OFFICE O/P EST MOD 30 MIN: CPT | Mod: PBBFAC,NTX | Performed by: INTERNAL MEDICINE

## 2022-08-09 PROCEDURE — 99214 PR OFFICE/OUTPT VISIT, EST, LEVL IV, 30-39 MIN: ICD-10-PCS | Mod: S$PBB,NTX,, | Performed by: INTERNAL MEDICINE

## 2022-08-09 PROCEDURE — 36415 COLL VENOUS BLD VENIPUNCTURE: CPT | Mod: TXP | Performed by: INTERNAL MEDICINE

## 2022-08-09 PROCEDURE — 99999 PR PBB SHADOW E&M-EST. PATIENT-LVL IV: ICD-10-PCS | Mod: PBBFAC,TXP,, | Performed by: INTERNAL MEDICINE

## 2022-08-09 PROCEDURE — 83735 ASSAY OF MAGNESIUM: CPT | Mod: TXP | Performed by: INTERNAL MEDICINE

## 2022-08-09 PROCEDURE — 99999 PR PBB SHADOW E&M-EST. PATIENT-LVL IV: CPT | Mod: PBBFAC,TXP,, | Performed by: INTERNAL MEDICINE

## 2022-08-09 RX ORDER — BUMETANIDE 0.5 MG/1
0.5 TABLET ORAL DAILY
Qty: 30 TABLET | Refills: 11 | Status: SHIPPED | OUTPATIENT
Start: 2022-08-09 | End: 2023-07-11

## 2022-08-09 RX ORDER — SACUBITRIL AND VALSARTAN 97; 103 MG/1; MG/1
1 TABLET, FILM COATED ORAL 2 TIMES DAILY
Qty: 90 TABLET | Refills: 3 | Status: SHIPPED | OUTPATIENT
Start: 2022-08-09 | End: 2023-06-08

## 2022-08-09 NOTE — PROGRESS NOTES
ADVANCED HEART FAILURE AND TRANSPLANTATION OFFICE VISIT    CHIEF COMPLAINT:  Evaluation of management of heart failure and consideration of advanced cardiac therapies    HISTORY OF PRESENT ILLNESS:  Lili Gerardo is a 67 y.o.  female with a past medical history remarkable for multivessel coronary artery disease, ischemic cardiomyopathy/HFrEF presenting to [facility] for follow-up    Co-morbidities: HTN for over 20 years, DM2 for about 10 years not on insulin, hyperlipidemia, obesity BMI 31.9    She established care with [facility] 6/14/2022 at which time we wanted to review outside cath films with interventional cardiology and cardiothoracic surgery as well as repeat viability testing (PET stress done prior to STEMI) for consideration of revascularization. The images are not uploaded and pending from [facility]. We also increased Entresto to 49-51 mg BID and she has been tolerating this well. Since then, she has been doing well. Continues going to cardiac rehab twice weekly with treadmill and other machines without SOB. Denies orthopnea, PND, bendopnea, abdominal distension, early satiety, nausea, lower extremity edema, chest discomfort, presyncope, palpitations, or syncope. Denies adverse bleeding, no hematochezia/melena/hematuria/hematemesis. Saw Endocrinologist recently and thought maybe was dehydrated.   Does not weight self daily but does at cardiac rehab. Weight at initial visit 83.5 kg and today 84.2 kg. Has not had HF hospitalizations.    Current diuretic regimen: Lasix 40 mg daily     GDMT: Toprol 75 mg BID, Entresto 49-51 mg BID, Aldactone 25 mg daily (but maybe taking 12.5 mg daily), Jardiance 25 mg daily  She is taking Ranexa 500 mg BID  Aspirin 81 mg daily and Brilinta 90 mg BID, Lipitor 80 mg qHS    Cardiac history:  States she was born with heart issues. Her mother had taken her to follow-ups at the age of 3 with concern for possible pulmonary stenosis. Saw Dr. Ellington locally at  age 14 with no issues and no intervention or medications. Diagnosed with coronary artery disease when she presented with an MI (appears to be inferior STEMI by ECG 1/29/2022 with symptoms of shortness of breath. She was admitted and underwent coronary angiography 2/3/2022 noting 30-40% LM, 90%prox LAD occluded mid with distal collateral filling, D1 80-90%, 95% CX, 95% OM1, RCA occluded proximally with large RV branch giving collaterals to mid-distal LAD. She did undergo balloon angioplasty to the CX, and YESICA to OM1 and unable to intervene on LAD noted as . She was told she was not a candidate for bypass surgery locally.     Since discharge, she has been following locally and placed on LifeVest. She is doing much better. She is accompanied by her daughter for today's visit. Notes no significant SOB but notes more fatigue and issues with knees. She is able to go upstairs to her granddaughter's two story house without SOB. Prior to admission she was struggling with SOB. Before presentation she had noticed a few months of shortness of breath.     6MWT at initial visit ambulated 244 meters with fatigue but did not stop and with no desaturations. Does cardiac rehab twice weekly. Follows with local Endocrinology. Does note checking daily weights but not told regarding fluid restriction. Follows salt restriction. She tries to take care of things around the house and baby sits her 3.5 year old granddaugther. She does basic housework.  Angina: + SOB  Myocardial infarction: +  Revascularization: +  CVA/TIA: neg  VTE: neg  AF/arrhythmias: neg  Obesity: +  Hyperlipidemia: +  DM: +  PAD: no per OSH testing    Cardiac Data: 5/6/2022 OSH: LVEDD 53 mm, EF 20-25% with RWMA and apical/inferior akinesis, normal RV size and function, mild MR, trace AR  Transthoracic Echo:  Results for orders placed during the hospital encounter of 11/08/21  · The left ventricle is severely enlarged with severe eccentric hypertrophy and severely  decreased systolic function.  · The estimated ejection fraction is about 25%.  · The quantitatively derived ejection fraction is 24%.  · There are segmental left ventricular wall motion abnormalities.  · Left ventricular diastolic dysfunction.  · Severe left atrial enlargement.  · Normal right ventricular size with normal right ventricular systolic function.  · Plaque present in the ascending aorta.  · Mild aortic regurgitation.  · Mild mitral regurgitation.  · The estimated PA systolic pressure is 16 mmHg.  · There is no pulmonary hypertension.  · Moderate pulmonic regurgitation.  · Normal central venous pressure (3 mmHg).  · Trivial pericardial effusion.    Nuclear PET stress 12/10/2021:    Abnormal myocardial perfusion scan.    Evidence of edy-infarct ischemia in the apex, mid AW and the LW; also, definite very large myocardial injury (scar) by nuclear medicine imaging in the IS, IW, ILW, LW, mid-distal AW and the apex. Only the AS and the ALW are well perfused.    The EKG portion of this study is negative for ischemia.    The patient reported no chest pain during the stress test.    There were no arrhythmias during stress.    There are two significant perfusion abnormalities.    The gated perfusion images showed an ejection fraction of 22% post stress.    Defect #1 - There is a severe intensity, very large sized, fixed defect consistent with scar in the basal to apical inferior, inferolateral, inferoseptal and lateral wall(s).    Defect #2 - There is a large sized, severe intensity, fixed defect consistent with scar  in the mid to apical anterior wall and in the apex wall(s).    ECG 1/9/2022:  bpm with ST elevation inferiorly and ST depressions high lateral leads    Left Heart Catheterization: above    Right Heart Catheterization: none    Devices: LifeVest, plans for ICD in August 2022 after Medicare    PAST MEDICAL HISTORY:  Past Medical History:   Diagnosis Date    Diabetes     Heart attack      01/2022    Heart murmur     Hypertension        PAST SURGICAL HISTORY:  Past Surgical History:   Procedure Laterality Date    GALLBLADDER SURGERY         SOCIAL HISTORY  Marital Status: single, 3 kids all with HTN  Occupation: retired, used to work as   Alcohol: never  Tobacco: in teens briefly  Marijuana: never  IV Drug Use: neg  Cocaine/meth: neg  Hx preeclampsia: none besides gestational HTN for one pregnancy    FAMILY HISTORY  No family history of early CAD or HF  Father with DM2 with complications  Mother with open heart surgery years ago possibly CABG  No SCD  First cousin with ICD for unclear reason    ALLERGIES:  Review of patient's allergies indicates:  No Known Allergies    MEDICATIONS  Current Outpatient Medications on File Prior to Visit   Medication Sig Dispense Refill    ALPRAZolam (XANAX) 0.25 MG tablet Take by mouth as needed for Anxiety.      aspirin (ECOTRIN) 81 MG EC tablet Adult Low Dose Aspirin 81 mg tablet,delayed release   Take 1 tablet every day by oral route.      atorvastatin (LIPITOR) 80 MG tablet Take 80 mg by mouth once daily.      blood sugar diagnostic Strp 1 strip by Misc.(Non-Drug; Combo Route) route once daily at 6am. True metrix test strips 100 strip 3    BRILINTA 90 mg tablet       empagliflozin (JARDIANCE) 25 mg tablet Take 1 tablet (25 mg total) by mouth once daily. 90 tablet 3    ENTRESTO 24-26 mg per tablet Take 2 tablets by mouth 2 (two) times daily. 180 tablet 3    furosemide (LASIX) 40 MG tablet Take 40 mg by mouth once daily.      lancets 32 gauge Misc 1 lancet by Misc.(Non-Drug; Combo Route) route 3 (three) times daily. 200 each 3    metFORMIN (GLUCOPHAGE) 500 MG tablet Take 1 tablet (500 mg total) by mouth once daily. Take every evening 90 tablet 3    metoprolol succinate (TOPROL-XL) 50 MG 24 hr tablet TAKE 1 & 1/2 (ONE & ONE-HALF) TABLETS BY MOUTH TWICE DAILY      pantoprazole (PROTONIX) 40 MG tablet Take 40 mg by mouth once daily.       "ranolazine (RANEXA) 500 MG Tb12 Take 500 mg by mouth 2 (two) times daily.      spironolactone (ALDACTONE) 25 MG tablet Take 1 tablet (25 mg total) by mouth once daily. 90 tablet 3     No current facility-administered medications on file prior to visit.       REVIEW OF SYSTEMS  Review of Systems   Constitutional: Negative for activity change, appetite change and fatigue.   Respiratory: Positive for shortness of breath. Negative for chest tightness.    Cardiovascular: Negative for chest pain and leg swelling.   Gastrointestinal: Negative for abdominal distention, blood in stool and nausea.   Genitourinary: Negative for difficulty urinating and hematuria.   Neurological: Negative for syncope and light-headedness.   Hematological: Does not bruise/bleed easily.   All other systems reviewed and are negative.      PHYSICAL EXAM:    Vitals:    08/09/22 0931 08/09/22 0933   BP: 121/60 (!) 110/59   BP Location: Left arm Right arm   Patient Position: Sitting Sitting   BP Method: Medium (Automatic) Medium (Automatic)   Pulse: 79 78   Weight: 84.2 kg (185 lb 10 oz)    Height: 5' 4" (1.626 m)     Body mass index is 31.86 kg/m².    GENERAL: Alert, NAD   HEENT: Anicteric sclerae  NECK: JVP visible above level of clavicle to low neck while sitting upright and to upper neck reclining 45 degrees  CARDIOVASCULAR: Regular rate and rhythm. Normal S1, S2 no murmurs, rubs or gallops.  PULMONARY: Lungs clear to auscultation bilaterally  ABDOMEN: Soft, nontender, nondistended. No guarding, no rebound, no hepatomegaly  EXTREMITIES: Warm and well-perfused. No clubbing, cyanosis or edema. No pre-sacral edema  VASCULAR: 2+ bilateral radial pulses  NEUROLOGIC: No focal deficits    LABS:    BMP  Lab Results   Component Value Date     08/01/2022    K 4.0 08/01/2022     08/01/2022    CO2 27 08/01/2022    BUN 26 (H) 08/01/2022    CREATININE 1.1 08/01/2022    CALCIUM 9.4 08/01/2022    ANIONGAP 12 08/01/2022    ESTGFRAFRICA >60.0 " 06/14/2022    EGFRNONAA 59.7 (A) 06/14/2022       Magnesium   Date Value Ref Range Status   01/24/2022 2.1 1.6 - 2.6 mg/dL Final       Lab Results   Component Value Date    WBC 9.75 06/14/2022    HGB 14.3 06/14/2022    HCT 42.1 06/14/2022    MCV 88 06/14/2022     06/14/2022         Lab Results   Component Value Date    INR 1.1 01/29/2022    INR 1.0 01/24/2022       BNP   Date Value Ref Range Status   06/14/2022 284 (H) 0 - 99 pg/mL Final     Comment:     Values of less than 100 pg/ml are consistent with non-CHF populations.   01/29/2022 1,067 (H) 0 - 99 pg/mL Final     Comment:     Values of less than 100 pg/ml are consistent with non-CHF populations.   01/24/2022 1,132 (H) 0 - 99 pg/mL Final     Comment:     Values of less than 100 pg/ml are consistent with non-CHF populations.         IMPRESSION:    NYHA Class II  ACC/AHA Stage C  Costa profile B    1. Ischemic cardiomyopathy    2. Heart failure with reduced ejection fraction    3. Chronic combined systolic (congestive) and diastolic (congestive) heart failure    4. Primary hypertension    5. Abnormal cardiovascular stress test    6. Other hyperlipidemia    7. Aortic atherosclerosis    8. Type 2 diabetes mellitus without complication, without long-term current use of insulin    9. Class 1 obesity due to excess calories without serious comorbidity with body mass index (BMI) of 31.0 to 31.9 in adult        PLAN:    Obtain cath films from [facility] for review. Obtain repeat PET stress. Pending labs today, plan to increase Entresto to  mg BID and take spironolactone 25 mg daily and change Lasix to Bumex 0.5 mg daily given hypervolemia by exam. Repeat labs locally next week.    Recommend 2 gram sodium restriction and 1500 mL fluid restriction.  Encourage physical activity with graded exercise program.  Requested patient to weigh themselves daily, and to notify us if their weight increases by more than 3 lbs in 1 day or 5 lbs in 1 week.     Pt  to call us with more shortness of breath, swelling or unexpected weight changes, fever, chills, bloody or black bowel movements, or other concerns.    F/U approximately 3 months after cath films received and PET stress completed    Electronically signed by:   Mahogany Arias   08/09/2022 11:52 AM

## 2022-08-09 NOTE — LETTER
August 9, 2022        Maciel Forbes  [address]  [place] [address]  Phone: [phone]  Fax: [phone]             [facility]  [address]  [facility] [address]  Phone: [phone]   Patient: Lili Gerardo   MR Number: [id number]   YOB: 1955   Date of Visit: 8/9/2022       Dear Dr. Maciel Forbes    Thank you for referring Lili Gerardo to me for evaluation. Attached you will find relevant portions of my assessment and plan of care.    If you have questions, please do not hesitate to call me. I look forward to following Lili Gerardo along with you.    Sincerely,    Mahogany Arias, DO    Enclosure    If you would like to receive this communication electronically, please contact [email or web address] or [phone] to request [employer] access.    [employer] is a tool which provides read-only access to select patient information with whom you have a relationship. Its easy to use and provides real time access to review your patients record including encounter summaries, notes, results, and demographic information.    If you feel you have received this communication in error or would no longer like to receive these types of communications, please e-mail [email or web address]

## 2022-08-18 ENCOUNTER — TELEPHONE (OUTPATIENT)
Dept: TRANSPLANT | Facility: CLINIC | Age: 67
End: 2022-08-18
Payer: COMMERCIAL

## 2022-08-18 NOTE — TELEPHONE ENCOUNTER
Pt scheduled for labs at [facility] on 8/16/22. Contacted facility, they informed me no labs from pt since June. Attempted to contact pt, no answer. Left vm.     Pt returned call, she is going to her PCP on tomorrow. Will get labs at PCP office.

## 2022-08-22 ENCOUNTER — TELEPHONE (OUTPATIENT)
Dept: TRANSPLANT | Facility: CLINIC | Age: 67
End: 2022-08-22
Payer: COMMERCIAL

## 2022-08-22 LAB
ALP ISOS SERPL LEV INH-CCNC: 58 U/L
BILIRUBIN TOTAL: 0.7
BNP: 125.2
EXT ALBUMIN: 4.3
EXT ALT: 19
EXT AST: 16
EXT BUN: 23
EXT CALCIUM: 9.8
EXT CHLORIDE: 103
EXT CREATININE: 1.02 MG/DL
EXT GLUCOSE: 163
EXT POTASSIUM: 4
EXT PROTEIN TOTAL: 11.8
EXT SODIUM: 138 MMOL/L

## 2022-08-22 NOTE — TELEPHONE ENCOUNTER
Received faxed labs from [facility] dated 8/19/22.   Results are consistent with recent trends.    Creat =1.02  K =4.0  BNP =125.2

## 2022-08-31 PROBLEM — I50.22 CHF (CONGESTIVE HEART FAILURE), NYHA CLASS II, CHRONIC, SYSTOLIC: Status: ACTIVE | Noted: 2022-06-14

## 2022-09-01 ENCOUNTER — TELEPHONE (OUTPATIENT)
Dept: TRANSPLANT | Facility: CLINIC | Age: 67
End: 2022-09-01
Payer: MEDICARE

## 2022-09-01 NOTE — TELEPHONE ENCOUNTER
On yesterday, Pt is s/p ICD placement at [facility]. Called to f/u. Phone went to , left message.    mild impairment

## 2022-10-14 ENCOUNTER — TELEPHONE (OUTPATIENT)
Dept: CARDIOLOGY | Facility: HOSPITAL | Age: 67
End: 2022-10-14
Payer: MEDICARE

## 2022-10-18 ENCOUNTER — HOSPITAL ENCOUNTER (OUTPATIENT)
Dept: CARDIOLOGY | Facility: HOSPITAL | Age: 67
Discharge: HOME | End: 2022-10-18
Attending: INTERNAL MEDICINE
Payer: MEDICARE

## 2022-10-18 VITALS
SYSTOLIC BLOOD PRESSURE: 109 MMHG | WEIGHT: 181 LBS | HEIGHT: 64 IN | HEART RATE: 67 BPM | BODY MASS INDEX: 30.9 KG/M2 | DIASTOLIC BLOOD PRESSURE: 73 MMHG

## 2022-10-18 DIAGNOSIS — I50.20 HEART FAILURE WITH REDUCED EJECTION FRACTION: ICD-10-CM

## 2022-10-18 DIAGNOSIS — I25.5 ISCHEMIC CARDIOMYOPATHY: ICD-10-CM

## 2022-10-18 LAB
CFR FLOW - ANTERIOR: 1.77
CFR FLOW - INFERIOR: 0.93
CFR FLOW - LATERAL: 1.6
CFR FLOW - MAX: 2.95
CFR FLOW - MIN: 0.63
CFR FLOW - SEPTAL: 1.17
CFR FLOW - WHOLE HEART: 1.37
CV PHARM DOSE: 0.4 MG
CV STRESS BASE HR: 67 BPM
DIASTOLIC BLOOD PRESSURE: 73 MMHG
EJECTION FRACTION- HIGH: 65 %
END DIASTOLIC INDEX-HIGH: 153 ML/M2
END DIASTOLIC INDEX-LOW: 93 ML/M2
END SYSTOLIC INDEX-HIGH: 71 ML/M2
END SYSTOLIC INDEX-LOW: 31 ML/M2
NUC REST DIASTOLIC VOLUME INDEX: 120
NUC REST EJECTION FRACTION: 40
NUC REST SYSTOLIC VOLUME INDEX: 72
NUC STRESS DIASTOLIC VOLUME INDEX: 163
NUC STRESS EJECTION FRACTION: 36 %
NUC STRESS SYSTOLIC VOLUME INDEX: 105
OHS CV CPX 85 PERCENT MAX PREDICTED HEART RATE MALE: 126
OHS CV CPX MAX PREDICTED HEART RATE: 149
OHS CV CPX PATIENT IS FEMALE: 1
OHS CV CPX PATIENT IS MALE: 0
OHS CV CPX PEAK DIASTOLIC BLOOD PRESSURE: 50 MMHG
OHS CV CPX PEAK HEAR RATE: 75 BPM
OHS CV CPX PEAK RATE PRESSURE PRODUCT: 8925
OHS CV CPX PEAK SYSTOLIC BLOOD PRESSURE: 119 MMHG
OHS CV CPX PERCENT MAX PREDICTED HEART RATE ACHIEVED: 50
OHS CV CPX RATE PRESSURE PRODUCT PRESENTING: 7303
PERFUSION DEFECT 1 SIZE IN %: 5 %
PERFUSION DEFECT 2 SIZE IN %: 2 %
PERFUSION DEFECT SIZE WORSENS % 1: 20 %
PERFUSION DEFECT WORSENS SIZE IN % 2: 37 %
REST FLOW - ANTERIOR: 0.49 CC/MIN/G
REST FLOW - INFERIOR: 0.42 CC/MIN/G
REST FLOW - LATERAL: 0.41 CC/MIN/G
REST FLOW - MAX: 0.64 CC/MIN/G
REST FLOW - MIN: 0.24 CC/MIN/G
REST FLOW - SEPTAL: 0.4 CC/MIN/G
REST FLOW - WHOLE HEART: 0.43 CC/MIN/G
RETIRED EF AND QEF - SEE NOTES: 53 %
STRESS FLOW - ANTERIOR: 0.87 CC/MIN/G
STRESS FLOW - INFERIOR: 0.39 CC/MIN/G
STRESS FLOW - LATERAL: 0.69 CC/MIN/G
STRESS FLOW - MAX: 1.42 CC/MIN/G
STRESS FLOW - MIN: 0.18 CC/MIN/G
STRESS FLOW - SEPTAL: 0.48 CC/MIN/G
STRESS FLOW - WHOLE HEART: 0.61 CC/MIN/G
SYSTOLIC BLOOD PRESSURE: 109 MMHG

## 2022-10-18 PROCEDURE — 63600175 PHARM REV CODE 636 W HCPCS: Mod: TXP | Performed by: INTERNAL MEDICINE

## 2022-10-18 PROCEDURE — 93018 CV STRESS TEST I&R ONLY: CPT | Mod: NTX,,, | Performed by: INTERNAL MEDICINE

## 2022-10-18 PROCEDURE — 93016 CARDIAC PET SCAN STRESS (CUPID ONLY): ICD-10-PCS | Mod: NTX,,, | Performed by: INTERNAL MEDICINE

## 2022-10-18 PROCEDURE — 78431 MYOCRD IMG PET RST&STRS CT: CPT | Mod: 26,NTX,, | Performed by: INTERNAL MEDICINE

## 2022-10-18 PROCEDURE — 93016 CV STRESS TEST SUPVJ ONLY: CPT | Mod: NTX,,, | Performed by: INTERNAL MEDICINE

## 2022-10-18 PROCEDURE — 78434 CARDIAC PET SCAN STRESS (CUPID ONLY): ICD-10-PCS | Mod: 26,NTX,, | Performed by: INTERNAL MEDICINE

## 2022-10-18 PROCEDURE — 78434 AQMBF PET REST & RX STRESS: CPT | Mod: TXP

## 2022-10-18 PROCEDURE — 78434 AQMBF PET REST & RX STRESS: CPT | Mod: 26,NTX,, | Performed by: INTERNAL MEDICINE

## 2022-10-18 PROCEDURE — 78431 CARDIAC PET SCAN STRESS (CUPID ONLY): ICD-10-PCS | Mod: 26,NTX,, | Performed by: INTERNAL MEDICINE

## 2022-10-18 PROCEDURE — 93018 CARDIAC PET SCAN STRESS (CUPID ONLY): ICD-10-PCS | Mod: NTX,,, | Performed by: INTERNAL MEDICINE

## 2022-10-18 RX ORDER — REGADENOSON 0.08 MG/ML
0.4 INJECTION, SOLUTION INTRAVENOUS ONCE
Status: COMPLETED | OUTPATIENT
Start: 2022-10-18 | End: 2022-10-18

## 2022-10-18 RX ADMIN — REGADENOSON 0.4 MG: 0.08 INJECTION, SOLUTION INTRAVENOUS at 11:10

## 2022-10-19 ENCOUNTER — TELEPHONE (OUTPATIENT)
Dept: TRANSPLANT | Facility: CLINIC | Age: 67
End: 2022-10-19
Payer: MEDICARE

## 2022-10-19 ENCOUNTER — TELEPHONE (OUTPATIENT)
Dept: CARDIOLOGY | Facility: CLINIC | Age: 67
End: 2022-10-19
Payer: MEDICARE

## 2022-10-19 DIAGNOSIS — I50.42 CHRONIC COMBINED SYSTOLIC (CONGESTIVE) AND DIASTOLIC (CONGESTIVE) HEART FAILURE: ICD-10-CM

## 2022-10-19 DIAGNOSIS — I50.20 HEART FAILURE WITH REDUCED EJECTION FRACTION: Primary | ICD-10-CM

## 2022-10-19 NOTE — TELEPHONE ENCOUNTER
Received referral for IC consult.  Left message for patient with return number to schedule with one of Interventional docs.

## 2022-11-14 NOTE — PROGRESS NOTES
ADVANCED HEART FAILURE AND TRANSPLANTATION OFFICE VISIT    CHIEF COMPLAINT:  Follow-up HF    HISTORY OF PRESENT ILLNESS:  Lili Gerardo is a 67 y.o.  female with a past medical history remarkable for multivessel coronary artery disease, ischemic cardiomyopathy/HFrEF presenting to [facility] for follow-up    Co-morbidities: HTN for over 20 years, DM2 for about 10 years not on insulin, hyperlipidemia, obesity BMI 31.9    She established care with [facility] 6/14/2022 at which time we wanted to review outside cath films with interventional cardiology and cardiothoracic surgery as well as repeat viability testing (PET stress done prior to STEMI) for consideration of revascularization. The images are not uploaded and pending from [facility].     At last visit 8/2022, we recommended obtaining cath films and repeating PET stress here, which showed a high risk study with reversible perfusion abnormalities and severely reduced coronary flow capacity in a multivessel distribution involving 60% of the ventricular myocardium. Ischemic dilatation of the LV is present. She was referred to interventional cardiology with an upcoming appointment 11/22.  In the interim, she underwent single lead ICD 8/31/2022 locally.     Does not weight self daily but does at cardiac rehab. Weight at initial visit 83.5 kg => 84.2 kg => 84.3 kg. Has not had HF hospitalizations. Completed cardiac rehab.     Since then, she states she is doing well. Notes primarily fatigue is limiting symptom. PCP started medicine for insomnia. Denies any SOB. Denies orthopnea, PND, bendopnea, abdominal distension, early satiety, nausea, lower extremity edema, chest discomfort, presyncope, palpitations, or syncope. Denies adverse bleeding, no hematochezia/melena/hematuria/hematemesis.    Current diuretic regimen: Bumex 0.5 mg daily    GDMT: Toprol 75 mg BID, Entresto  mg BID, Aldactone 25 mg daily (but maybe taking 12.5 mg daily), Jardiance  25 mg daily  She is taking Ranexa 500 mg BID  Aspirin 81 mg daily and Brilinta 90 mg BID, Lipitor 80 mg qHS    Cardiac history:  States she was born with heart issues. Her mother had taken her to follow-ups at the age of 3 with concern for possible pulmonary stenosis. Saw Dr. Ellington locally at age 14 with no issues and no intervention or medications. Diagnosed with coronary artery disease when she presented with an MI (appears to be inferior STEMI by ECG 1/29/2022 with symptoms of shortness of breath. She was admitted and underwent coronary angiography 2/3/2022 noting 30-40% LM, 90%prox LAD occluded mid with distal collateral filling, D1 80-90%, 95% CX, 95% OM1, RCA occluded proximally with large RV branch giving collaterals to mid-distal LAD. She did undergo balloon angioplasty to the CX, and YESICA to OM1 and unable to intervene on LAD noted as . She was told she was not a candidate for bypass surgery locally.     Since discharge, she has been following locally and placed on LifeVest. She is doing much better. She is accompanied by her daughter for today's visit. Notes no significant SOB but notes more fatigue and issues with knees. She is able to go upstairs to her granddaughter's two story house without SOB. Prior to admission she was struggling with SOB. Before presentation she had noticed a few months of shortness of breath.     6MWT at initial visit ambulated 244 meters with fatigue but did not stop and with no desaturations. Does cardiac rehab twice weekly. Follows with local Endocrinology. Does note checking daily weights but not told regarding fluid restriction. Follows salt restriction. She tries to take care of things around the house and baby sits her 3.5 year old granddaugther. She does basic housework.  Angina: + SOB  Myocardial infarction: +  Revascularization: +  CVA/TIA: neg  VTE: neg  AF/arrhythmias: neg  Obesity: +  Hyperlipidemia: +  DM: +  PAD: no per OSH testing    Cardiac Data: Transthoracic  Echo:   5/6/2022 OSH: LVEDD 53 mm, EF 20-25% with RWMA and apical/inferior akinesis, normal RV size and function, mild MR, trace AR    Results for orders placed during the hospital encounter of 11/08/21  · The left ventricle is severely enlarged with severe eccentric hypertrophy and severely decreased systolic function.  · The estimated ejection fraction is about 25%.  · The quantitatively derived ejection fraction is 24%.  · There are segmental left ventricular wall motion abnormalities.  · Left ventricular diastolic dysfunction.  · Severe left atrial enlargement.  · Normal right ventricular size with normal right ventricular systolic function.  · Plaque present in the ascending aorta.  · Mild aortic regurgitation.  · Mild mitral regurgitation.  · The estimated PA systolic pressure is 16 mmHg.  · There is no pulmonary hypertension.  · Moderate pulmonic regurgitation.  · Normal central venous pressure (3 mmHg).  · Trivial pericardial effusion.    PET stress 10/18/2022:   There are two significant perfusion abnormalities.    Perfusion Abnormality #1 - There is a very small (< 5%) sized, mild intensity mid to apical anterior and anteroseptal resting perfusion abnormality involving 5% of LV myocardium in the distribution of the LAD. After pharmacologic stress, this perfusion abnormality is larger and more severe involving 20% of the LV myocardium, indicative of ischemia with underlying scar.    Perfusion Abnormality #2 - There is a very small (<5%) sized, mild intensity basal to apical inferior and inferolateral resting perfusion abnormality involving 2% of LV myocardium in the distribution of the LCX and RCA territory. After pharmacologic stress, this perfusion abnormality is larger and more severe involving 37% of the LV myocardium indicative of ischemia with underlying infarct.    There are no other significant perfusion abnormalities.    The whole heart absolute myocardial perfusion values averaged 0.43 cc/min/g at  rest, which is reduced; 0.61 cc/min/g at stress, which is severely reduced; and CFR is 1.37 , which is moderately reduced.    CT attenuation images demonstrate severe diffuse coronary calcifications and mild diffuse aortic calcifications.    The gated perfusion images showed an ejection fraction of 40% at rest and 36% during stress. A normal ejection fraction is greater than 53%.    There is mild global hypokinesis at rest and moderate global hypokinesis during stress .    The LV cavity size is normal at rest and mildly enlarged at stress.    The EKG portion of this study is negative for ischemia.    There were no arrhythmias during stress.    The patient reported no chest pain during the stress test.    There are no prior studies for comparison.   This is a high risk study with reversible perfusion abnormalities and severely reduced coronary flow capacity in a multivessel distribution involving 60% of the ventricular myocardium. Ischemic dilatation of the LV is present.    Nuclear PET stress 12/10/2021:    Abnormal myocardial perfusion scan.    Evidence of edy-infarct ischemia in the apex, mid AW and the LW; also, definite very large myocardial injury (scar) by nuclear medicine imaging in the IS, IW, ILW, LW, mid-distal AW and the apex. Only the AS and the ALW are well perfused.    The EKG portion of this study is negative for ischemia.    The patient reported no chest pain during the stress test.    There were no arrhythmias during stress.    There are two significant perfusion abnormalities.    The gated perfusion images showed an ejection fraction of 22% post stress.    Defect #1 - There is a severe intensity, very large sized, fixed defect consistent with scar in the basal to apical inferior, inferolateral, inferoseptal and lateral wall(s).    Defect #2 - There is a large sized, severe intensity, fixed defect consistent with scar  in the mid to apical anterior wall and in the apex wall(s).    ECG 1/9/2022: ST  112 bpm with ST elevation inferiorly and ST depressions high lateral leads    Left Heart Catheterization: above    Right Heart Catheterization: none    Devices: single lead ICD  Generator - Medtronic Visia AF MRI VR SureScan Model#[id number]           Serial#[id number]  Ventricular electrode - Medtronic Model#[id number]   Serial#[id number]    PAST MEDICAL HISTORY:  Past Medical History:   Diagnosis Date    Acid reflux     CHF (congestive heart failure)     Coronary artery disease     Diabetes     Heart attack     01/2022    Heart murmur     Hypertension        PAST SURGICAL HISTORY:  Past Surgical History:   Procedure Laterality Date    CORONARY STENT PLACEMENT      GALLBLADDER SURGERY      removed       SOCIAL HISTORY  Marital Status: single, 3 kids all with HTN  Occupation: retired, used to work as   Alcohol: never  Tobacco: in teens briefly  Marijuana: never  IV Drug Use: neg  Cocaine/meth: neg  Hx preeclampsia: none besides gestational HTN for one pregnancy    FAMILY HISTORY  No family history of early CAD or HF  Father with DM2 with complications  Mother with open heart surgery years ago possibly CABG  No SCD  First cousin with ICD for unclear reason    ALLERGIES:  Review of patient's allergies indicates:  No Known Allergies    MEDICATIONS  Current Outpatient Medications on File Prior to Visit   Medication Sig Dispense Refill    ALPRAZolam (XANAX) 0.25 MG tablet Take by mouth as needed for Anxiety.      aspirin (ECOTRIN) 81 MG EC tablet Adult Low Dose Aspirin 81 mg tablet,delayed release   Take 1 tablet every day by oral route.      atorvastatin (LIPITOR) 80 MG tablet Take 80 mg by mouth once daily.      BRILINTA 90 mg tablet 90 mg 2 (two) times daily.      bumetanide (BUMEX) 0.5 MG Tab Take 1 tablet (0.5 mg total) by mouth once daily. 30 tablet 11    empagliflozin (JARDIANCE) 25 mg tablet Take 1 tablet (25 mg total) by mouth once daily. 90 tablet 3    ergocalciferol (ERGOCALCIFEROL) 50,000 unit Cap Take  1 capsule (50,000 Units total) by mouth every 7 days. 12 capsule 1    lancets 32 gauge Misc 1 lancet by Misc.(Non-Drug; Combo Route) route 3 (three) times daily. 200 each 3    metFORMIN (GLUCOPHAGE) 500 MG tablet Take 1 tablet (500 mg total) by mouth once daily. Take every evening 90 tablet 3    metoprolol succinate (TOPROL-XL) 50 MG 24 hr tablet TAKE 1 & 1/2 (ONE & ONE-HALF) TABLETS BY MOUTH TWICE DAILY      oxyCODONE-acetaminophen (PERCOCET) 5-325 mg per tablet Take 1 tablet by mouth every 6 (six) hours as needed for Pain.      pantoprazole (PROTONIX) 40 MG tablet Take 40 mg by mouth once daily.      ranolazine (RANEXA) 500 MG Tb12 Take 500 mg by mouth 2 (two) times daily.      sacubitriL-valsartan (ENTRESTO)  mg per tablet Take 1 tablet by mouth 2 (two) times daily. 90 tablet 3    spironolactone (ALDACTONE) 25 MG tablet Take 1 tablet (25 mg total) by mouth once daily. 90 tablet 3    [DISCONTINUED] pravastatin (PRAVACHOL) 20 MG tablet Take 1 tablet (20 mg total) by mouth once daily. 90 tablet 3    [DISCONTINUED] SITagliptin (JANUVIA) 100 MG Tab Take 1 tablet (100 mg total) by mouth once daily. 30 tablet 11     No current facility-administered medications on file prior to visit.       REVIEW OF SYSTEMS  Review of Systems   Constitutional:  Negative for activity change, appetite change and fatigue.   Respiratory:  Positive for shortness of breath. Negative for chest tightness.    Cardiovascular:  Negative for chest pain and leg swelling.   Gastrointestinal:  Negative for abdominal distention, blood in stool and nausea.   Genitourinary:  Negative for difficulty urinating and hematuria.   Neurological:  Negative for syncope and light-headedness.   Hematological:  Does not bruise/bleed easily.   All other systems reviewed and are negative.    PHYSICAL EXAM:    Vitals:    11/15/22 0916   BP: 121/63   BP Location: Right arm   Patient Position: Sitting   BP Method: Medium (Automatic)   Pulse: 86   Weight: 84.3 kg  "(185 lb 13.6 oz)   Height: 5' 4" (1.626 m)      Body mass index is 31.9 kg/m².    GENERAL: Alert, NAD   HEENT: Anicteric sclerae  NECK: JVP visible above level of clavicle to low neck while sitting upright and to upper neck reclining 45 degrees  CARDIOVASCULAR: Regular rate and rhythm. Normal S1, S2 no murmurs, rubs or gallops.  PULMONARY: Lungs clear to auscultation bilaterally  ABDOMEN: Soft, nontender, nondistended. No guarding, no rebound, no hepatomegaly  EXTREMITIES: Warm and well-perfused. No clubbing, cyanosis or edema. No pre-sacral edema  VASCULAR: 2+ bilateral radial pulses  NEUROLOGIC: No focal deficits    LABS:    BMP  Lab Results   Component Value Date     08/09/2022    K 3.8 08/09/2022     08/09/2022    CO2 27 08/09/2022    BUN 28 (H) 08/09/2022    CREATININE 1.0 08/09/2022    CALCIUM 10.1 08/09/2022    ANIONGAP 12 08/09/2022    ESTGFRAFRICA >60.0 06/14/2022    EGFRNONAA 59.7 (A) 06/14/2022       Magnesium   Date Value Ref Range Status   08/09/2022 1.9 1.6 - 2.6 mg/dL Final       Lab Results   Component Value Date    WBC 8.84 08/09/2022    HGB 13.9 08/09/2022    HCT 42.0 08/09/2022    MCV 91 08/09/2022     08/09/2022         Lab Results   Component Value Date    INR 1.1 01/29/2022    INR 1.0 01/24/2022       BNP   Date Value Ref Range Status   08/09/2022 148 (H) 0 - 99 pg/mL Final     Comment:     Values of less than 100 pg/ml are consistent with non-CHF populations.   06/14/2022 284 (H) 0 - 99 pg/mL Final     Comment:     Values of less than 100 pg/ml are consistent with non-CHF populations.   01/29/2022 1,067 (H) 0 - 99 pg/mL Final     Comment:     Values of less than 100 pg/ml are consistent with non-CHF populations.         IMPRESSION:    NYHA Class II  ACC/AHA Stage C  Costa profile B    1. Heart failure with reduced ejection fraction    2. Coronary artery disease, unspecified vessel or lesion type, unspecified whether angina present, unspecified whether native or " transplanted heart    3. Ischemic cardiomyopathy    4. Abnormal cardiovascular stress test    5. Aortic atherosclerosis    6. Primary hypertension    7. Type 2 diabetes mellitus without complication, without long-term current use of insulin    8. Class 1 obesity due to excess calories without serious comorbidity with body mass index (BMI) of 31.0 to 31.9 in adult    9. Other hyperlipidemia        PLAN:    Will plan for evaluation by interventional cardiology regarding consideration for revascularization. Will also likely need to get opinion from CTS regarding surgical revascularization given underlying DM2.   In interim, increase Toprol to 100 mg BID.    Recommend 2 gram sodium restriction and 1500 mL fluid restriction.  Encourage physical activity with graded exercise program.  Requested patient to weigh themselves daily, and to notify us if their weight increases by more than 3 lbs in 1 day or 5 lbs in 1 week.     Pt to call us with more shortness of breath, swelling or unexpected weight changes, fever, chills, bloody or black bowel movements, or other concerns.    F/U approximately 1 month after above visits.    Electronically signed by:   Mahogany Arias   11/14/2022 11:52 AM

## 2022-11-15 ENCOUNTER — OFFICE VISIT (OUTPATIENT)
Dept: TRANSPLANT | Facility: CLINIC | Age: 67
End: 2022-11-15
Payer: MEDICARE

## 2022-11-15 ENCOUNTER — LAB VISIT (OUTPATIENT)
Dept: LAB | Facility: HOSPITAL | Age: 67
End: 2022-11-15
Attending: INTERNAL MEDICINE
Payer: MEDICARE

## 2022-11-15 ENCOUNTER — TELEPHONE (OUTPATIENT)
Dept: TRANSPLANT | Facility: CLINIC | Age: 67
End: 2022-11-15

## 2022-11-15 VITALS
BODY MASS INDEX: 31.73 KG/M2 | DIASTOLIC BLOOD PRESSURE: 63 MMHG | HEART RATE: 86 BPM | SYSTOLIC BLOOD PRESSURE: 121 MMHG | HEIGHT: 64 IN | WEIGHT: 185.88 LBS

## 2022-11-15 DIAGNOSIS — I25.5 ISCHEMIC CARDIOMYOPATHY: ICD-10-CM

## 2022-11-15 DIAGNOSIS — I10 PRIMARY HYPERTENSION: ICD-10-CM

## 2022-11-15 DIAGNOSIS — E11.9 TYPE 2 DIABETES MELLITUS WITHOUT COMPLICATION, WITHOUT LONG-TERM CURRENT USE OF INSULIN: ICD-10-CM

## 2022-11-15 DIAGNOSIS — I70.0 AORTIC ATHEROSCLEROSIS: ICD-10-CM

## 2022-11-15 DIAGNOSIS — I50.20 HEART FAILURE WITH REDUCED EJECTION FRACTION: ICD-10-CM

## 2022-11-15 DIAGNOSIS — E66.09 CLASS 1 OBESITY DUE TO EXCESS CALORIES WITHOUT SERIOUS COMORBIDITY WITH BODY MASS INDEX (BMI) OF 31.0 TO 31.9 IN ADULT: ICD-10-CM

## 2022-11-15 DIAGNOSIS — R94.39 ABNORMAL CARDIOVASCULAR STRESS TEST: ICD-10-CM

## 2022-11-15 DIAGNOSIS — I25.10 CORONARY ARTERY DISEASE, UNSPECIFIED VESSEL OR LESION TYPE, UNSPECIFIED WHETHER ANGINA PRESENT, UNSPECIFIED WHETHER NATIVE OR TRANSPLANTED HEART: ICD-10-CM

## 2022-11-15 DIAGNOSIS — I50.20 HEART FAILURE WITH REDUCED EJECTION FRACTION: Primary | ICD-10-CM

## 2022-11-15 DIAGNOSIS — E78.49 OTHER HYPERLIPIDEMIA: ICD-10-CM

## 2022-11-15 LAB
ALBUMIN SERPL BCP-MCNC: 3.5 G/DL (ref 3.5–5.2)
ALP SERPL-CCNC: 77 U/L (ref 55–135)
ALT SERPL W/O P-5'-P-CCNC: 10 U/L (ref 10–44)
ANION GAP SERPL CALC-SCNC: 8 MMOL/L (ref 8–16)
AST SERPL-CCNC: 13 U/L (ref 10–40)
BASOPHILS # BLD AUTO: 0.06 K/UL (ref 0–0.2)
BASOPHILS NFR BLD: 0.7 % (ref 0–1.9)
BILIRUB SERPL-MCNC: 0.6 MG/DL (ref 0.1–1)
BNP SERPL-MCNC: 115 PG/ML (ref 0–99)
BUN SERPL-MCNC: 29 MG/DL (ref 8–23)
CALCIUM SERPL-MCNC: 10 MG/DL (ref 8.7–10.5)
CHLORIDE SERPL-SCNC: 102 MMOL/L (ref 95–110)
CO2 SERPL-SCNC: 25 MMOL/L (ref 23–29)
CREAT SERPL-MCNC: 1.3 MG/DL (ref 0.5–1.4)
DIFFERENTIAL METHOD: NORMAL
EOSINOPHIL # BLD AUTO: 0.2 K/UL (ref 0–0.5)
EOSINOPHIL NFR BLD: 2.1 % (ref 0–8)
ERYTHROCYTE [DISTWIDTH] IN BLOOD BY AUTOMATED COUNT: 13 % (ref 11.5–14.5)
EST. GFR  (NO RACE VARIABLE): 45.6 ML/MIN/1.73 M^2
GLUCOSE SERPL-MCNC: 226 MG/DL (ref 70–110)
HCT VFR BLD AUTO: 44.2 % (ref 37–48.5)
HGB BLD-MCNC: 14.3 G/DL (ref 12–16)
IMM GRANULOCYTES # BLD AUTO: 0.04 K/UL (ref 0–0.04)
IMM GRANULOCYTES NFR BLD AUTO: 0.4 % (ref 0–0.5)
LYMPHOCYTES # BLD AUTO: 2.6 K/UL (ref 1–4.8)
LYMPHOCYTES NFR BLD: 29.5 % (ref 18–48)
MAGNESIUM SERPL-MCNC: 2.1 MG/DL (ref 1.6–2.6)
MCH RBC QN AUTO: 28.3 PG (ref 27–31)
MCHC RBC AUTO-ENTMCNC: 32.4 G/DL (ref 32–36)
MCV RBC AUTO: 88 FL (ref 82–98)
MONOCYTES # BLD AUTO: 0.6 K/UL (ref 0.3–1)
MONOCYTES NFR BLD: 7 % (ref 4–15)
NEUTROPHILS # BLD AUTO: 5.4 K/UL (ref 1.8–7.7)
NEUTROPHILS NFR BLD: 60.3 % (ref 38–73)
NRBC BLD-RTO: 0 /100 WBC
PLATELET # BLD AUTO: 287 K/UL (ref 150–450)
PMV BLD AUTO: 10.4 FL (ref 9.2–12.9)
POTASSIUM SERPL-SCNC: 4.6 MMOL/L (ref 3.5–5.1)
PROT SERPL-MCNC: 8.4 G/DL (ref 6–8.4)
RBC # BLD AUTO: 5.05 M/UL (ref 4–5.4)
SODIUM SERPL-SCNC: 135 MMOL/L (ref 136–145)
WBC # BLD AUTO: 8.94 K/UL (ref 3.9–12.7)

## 2022-11-15 PROCEDURE — 80053 COMPREHEN METABOLIC PANEL: CPT | Mod: NTX | Performed by: INTERNAL MEDICINE

## 2022-11-15 PROCEDURE — 85025 COMPLETE CBC W/AUTO DIFF WBC: CPT | Mod: NTX | Performed by: INTERNAL MEDICINE

## 2022-11-15 PROCEDURE — 99213 OFFICE O/P EST LOW 20 MIN: CPT | Mod: PBBFAC,NTX | Performed by: INTERNAL MEDICINE

## 2022-11-15 PROCEDURE — 99999 PR PBB SHADOW E&M-EST. PATIENT-LVL III: CPT | Mod: PBBFAC,TXP,, | Performed by: INTERNAL MEDICINE

## 2022-11-15 PROCEDURE — 83880 ASSAY OF NATRIURETIC PEPTIDE: CPT | Mod: NTX | Performed by: INTERNAL MEDICINE

## 2022-11-15 PROCEDURE — 99214 PR OFFICE/OUTPT VISIT, EST, LEVL IV, 30-39 MIN: ICD-10-PCS | Mod: S$PBB,,, | Performed by: INTERNAL MEDICINE

## 2022-11-15 PROCEDURE — 99214 OFFICE O/P EST MOD 30 MIN: CPT | Mod: S$PBB,,, | Performed by: INTERNAL MEDICINE

## 2022-11-15 PROCEDURE — 36415 COLL VENOUS BLD VENIPUNCTURE: CPT | Mod: NTX | Performed by: INTERNAL MEDICINE

## 2022-11-15 PROCEDURE — 99999 PR PBB SHADOW E&M-EST. PATIENT-LVL III: ICD-10-PCS | Mod: PBBFAC,TXP,, | Performed by: INTERNAL MEDICINE

## 2022-11-15 PROCEDURE — 83735 ASSAY OF MAGNESIUM: CPT | Performed by: INTERNAL MEDICINE

## 2022-11-15 RX ORDER — TEMAZEPAM 15 MG/1
15 CAPSULE ORAL NIGHTLY
COMMUNITY
Start: 2022-10-31

## 2022-11-15 RX ORDER — METOPROLOL SUCCINATE 100 MG/1
100 TABLET, EXTENDED RELEASE ORAL 2 TIMES DAILY
Qty: 90 TABLET | Refills: 3 | Status: SHIPPED | OUTPATIENT
Start: 2022-11-15 | End: 2023-07-26 | Stop reason: SDUPTHER

## 2022-11-15 NOTE — LETTER
November 15, 2022        Maciel Forbes  [address]  [place] [address]  Phone: [phone]  Fax: [phone]             [facility]  [address]  [facility] [address]  Phone: [phone]   Patient: Lili Gerardo   MR Number: [id number]   YOB: 1955   Date of Visit: 11/15/2022       Dear Dr. Maciel Forbes    Thank you for referring Lili Gerardo to me for evaluation. Attached you will find relevant portions of my assessment and plan of care.    If you have questions, please do not hesitate to call me. I look forward to following Lili Gerardo along with you.    Sincerely,    Mahogany Arias, DO    Enclosure    If you would like to receive this communication electronically, please contact [email or web address] or [phone] to request [employer] access.    [employer] is a tool which provides read-only access to select patient information with whom you have a relationship. Its easy to use and provides real time access to review your patients record including encounter summaries, notes, results, and demographic information.    If you feel you have received this communication in error or would no longer like to receive these types of communications, please e-mail [email or web address]

## 2022-11-15 NOTE — TELEPHONE ENCOUNTER
Care of patient is being transferred to CHF section from Preht section, per Dr. Vides.    Dx:CHF    Reason:Does not meet advance options criteria   Pt is not on home inotrope therapy.  RTC:12/16/22

## 2022-11-22 ENCOUNTER — OFFICE VISIT (OUTPATIENT)
Dept: CARDIOLOGY | Facility: CLINIC | Age: 67
End: 2022-11-22
Payer: MEDICARE

## 2022-11-22 VITALS
BODY MASS INDEX: 32.48 KG/M2 | WEIGHT: 190.25 LBS | DIASTOLIC BLOOD PRESSURE: 64 MMHG | HEIGHT: 64 IN | OXYGEN SATURATION: 98 % | SYSTOLIC BLOOD PRESSURE: 107 MMHG | HEART RATE: 79 BPM

## 2022-11-22 DIAGNOSIS — I50.42 CHRONIC COMBINED SYSTOLIC (CONGESTIVE) AND DIASTOLIC (CONGESTIVE) HEART FAILURE: ICD-10-CM

## 2022-11-22 DIAGNOSIS — Z95.820 S/P ANGIOPLASTY WITH STENT: ICD-10-CM

## 2022-11-22 DIAGNOSIS — E66.09 CLASS 1 OBESITY DUE TO EXCESS CALORIES WITHOUT SERIOUS COMORBIDITY WITH BODY MASS INDEX (BMI) OF 31.0 TO 31.9 IN ADULT: ICD-10-CM

## 2022-11-22 DIAGNOSIS — I25.10 CORONARY ARTERY DISEASE, UNSPECIFIED VESSEL OR LESION TYPE, UNSPECIFIED WHETHER ANGINA PRESENT, UNSPECIFIED WHETHER NATIVE OR TRANSPLANTED HEART: Primary | ICD-10-CM

## 2022-11-22 PROCEDURE — 99205 OFFICE O/P NEW HI 60 MIN: CPT | Mod: S$PBB,,, | Performed by: INTERNAL MEDICINE

## 2022-11-22 PROCEDURE — 99999 PR PBB SHADOW E&M-EST. PATIENT-LVL IV: ICD-10-PCS | Mod: PBBFAC,,, | Performed by: INTERNAL MEDICINE

## 2022-11-22 PROCEDURE — 99999 PR PBB SHADOW E&M-EST. PATIENT-LVL IV: CPT | Mod: PBBFAC,,, | Performed by: INTERNAL MEDICINE

## 2022-11-22 PROCEDURE — 99214 OFFICE O/P EST MOD 30 MIN: CPT | Mod: PBBFAC | Performed by: INTERNAL MEDICINE

## 2022-11-22 PROCEDURE — 99205 PR OFFICE/OUTPT VISIT, NEW, LEVL V, 60-74 MIN: ICD-10-PCS | Mod: S$PBB,,, | Performed by: INTERNAL MEDICINE

## 2022-11-22 NOTE — PROGRESS NOTES
Subjective:   Lili Gerardo is a 67 y.o.  female with a past medical history remarkable for MI in 1/2022 s/p [facility] and PCI in [facility] with [facility] showing multivessel coronary artery disease, ischemic cardiomyopathy/HFrEF with EF 25%  Co-morbidities: CKD 3, HTN for over 20 years, DM2 for about 10 years not on insulin, hyperlipidemia, obesity BMI 31.9.  She lives in [place], [place] with her daughter. She can walk w/o chest pain or SOB. She does get tired at the end of the day after baby sitting her grand daughter. No dizziness, falls, LOC. No problems walking from parking lot to the clinic. She sees Dr Arias in [facility] who obtained PET scan which showed large areas of reversible ischemia in LAD and Lcx territories. Her Cr is 1.3 with GFR 45.     Review of Systems   Constitutional: Positive for malaise/fatigue. Negative for decreased appetite.   Cardiovascular:  Negative for chest pain, dyspnea on exertion, leg swelling, orthopnea, paroxysmal nocturnal dyspnea and syncope.   Respiratory:  Negative for cough and shortness of breath.    Hematologic/Lymphatic: Negative for adenopathy.   Skin:  Negative for color change.   Musculoskeletal:  Positive for arthritis (knee pain).   Gastrointestinal:  Negative for abdominal pain.   Genitourinary:  Negative for dysuria.   Neurological:  Negative for headaches.   Psychiatric/Behavioral:  Negative for depression.       Objective:    Physical Exam  Constitutional:       Appearance: She is obese.   HENT:      Head: Normocephalic.   Eyes:      Pupils: Pupils are equal, round, and reactive to light.   Cardiovascular:      Rate and Rhythm: Normal rate and regular rhythm.      Comments: Pulse regular  JVP 6 cmH2o  Lungs CTA  Pulmonary:      Effort: Pulmonary effort is normal.      Breath sounds: Normal breath sounds.   Abdominal:      General: There is distension.      Palpations: Abdomen is soft.   Skin:     General: Skin is warm.   Neurological:      General: No focal  "deficit present.      Mental Status: She is alert.   Psychiatric:         Mood and Affect: Mood normal.      Comments: Anxious and tearful        Vitals:    11/22/22 0933 11/22/22 0937   BP: 116/68 107/64   BP Location: Left arm Right arm   Patient Position: Sitting Sitting   BP Method: Large (Automatic) Large (Automatic)   Pulse: 72 79   SpO2: 98%    Weight: 86.3 kg (190 lb 4.1 oz)    Height: 5' 4" (1.626 m)      Current Outpatient Medications   Medication Instructions    aspirin (ECOTRIN) 81 MG EC tablet Adult Low Dose Aspirin 81 mg tablet,delayed release   Take 1 tablet every day by oral route.    atorvastatin (LIPITOR) 80 mg, Oral, Daily    BRILINTA 90 mg, 2 times daily    bumetanide (BUMEX) 0.5 mg, Oral, Daily    ergocalciferol (ERGOCALCIFEROL) 50,000 Units, Oral, Every 7 days    JARDIANCE 25 mg, Oral, Daily    lancets 32 gauge Misc 1 lancet, Misc.(Non-Drug; Combo Route), 3 times daily    metFORMIN (GLUCOPHAGE) 500 mg, Oral, Daily, Take every evening    metoprolol succinate (TOPROL-XL) 100 mg, Oral, 2 times daily    pantoprazole (PROTONIX) 40 mg, Oral, Daily    ranolazine (RANEXA) 500 mg, Oral, 2 times daily    sacubitriL-valsartan (ENTRESTO)  mg per tablet 1 tablet, Oral, 2 times daily    spironolactone (ALDACTONE) 25 mg, Oral, Daily    temazepam (RESTORIL) 15 mg, Oral, Nightly       Assessment:       1. Coronary artery disease, unspecified vessel or lesion type, unspecified whether angina present, unspecified whether native or transplanted heart    2. Chronic combined systolic (congestive) and diastolic (congestive) heart failure    3. S/P angioplasty with stent    4. Class 1 obesity due to excess calories without serious comorbidity with body mass index (BMI) of 31.0 to 31.9 in adult         Plan:         #CAD with h/o PCI in Jan 2022 (unknown anatomy)  -had dyspnea leading to MI diagnosis leading to PCI to an artery with [facility] in Jan 2022  -need to obtain films from [facility]  -EF 25%, on GDMT, euvolemic and " symptom free at this time  -PET scan rev'd showing reversible ischemia in 60% of LV myocardium in Cx and LAD territory  -will obtain films, then schedule for LHC +/- PCI based on that, consents done    #HFrEF due to ICM  -follows up with HTS  -on good GDMT, euvolemic and symptom free, has ICD        Interventional Cardiology Staff  I have personally taken the history and examined this patient. I have discussed and agree with the resident's findings and plan as documented in the resident's note.    Manoj Clay

## 2022-12-08 ENCOUNTER — PATIENT MESSAGE (OUTPATIENT)
Dept: CARDIOLOGY | Facility: CLINIC | Age: 67
End: 2022-12-08
Payer: MEDICARE

## 2022-12-09 ENCOUNTER — DOCUMENTATION ONLY (OUTPATIENT)
Dept: CARDIOLOGY | Facility: CLINIC | Age: 67
End: 2022-12-09
Payer: MEDICARE

## 2022-12-13 ENCOUNTER — OFFICE VISIT (OUTPATIENT)
Dept: CARDIOLOGY | Facility: CLINIC | Age: 67
End: 2022-12-13
Payer: MEDICARE

## 2022-12-13 DIAGNOSIS — I25.10 ATHEROSCLEROSIS OF NATIVE CORONARY ARTERY OF NATIVE HEART WITHOUT ANGINA PECTORIS: Primary | ICD-10-CM

## 2022-12-13 DIAGNOSIS — I50.42 CHRONIC COMBINED SYSTOLIC AND DIASTOLIC HEART FAILURE: ICD-10-CM

## 2022-12-13 PROCEDURE — 99213 OFFICE O/P EST LOW 20 MIN: CPT | Mod: 95,,, | Performed by: INTERNAL MEDICINE

## 2022-12-13 PROCEDURE — 99213 PR OFFICE/OUTPT VISIT, EST, LEVL III, 20-29 MIN: ICD-10-PCS | Mod: 95,,, | Performed by: INTERNAL MEDICINE

## 2022-12-13 NOTE — PROGRESS NOTES
The patient location is: home  The chief complaint leading to consultation is: CAD    Visit type: audio only    Face to Face time with patient: 20   45 minutes of total time spent on the encounter, which includes face to face time and non-face to face time preparing to see the patient (eg, review of tests), Obtaining and/or reviewing separately obtained history, Documenting clinical information in the electronic or other health record, Independently interpreting results (not separately reported) and communicating results to the patient/family/caregiver, or Care coordination (not separately reported).         Each patient to whom he or she provides medical services by telemedicine is:  (1) informed of the relationship between the physician and patient and the respective role of any other health care provider with respect to management of the patient; and (2) notified that he or she may decline to receive medical services by telemedicine and may withdraw from such care at any time.    Notes:   Lili Gerardo is a 67 y.o.  female with a past medical history remarkable for MI in 1/2022 s/[facility] and PCI in [facility] with [facility] showing multivessel coronary artery disease, ischemic cardiomyopathy/HFrEF with EF 25%  Co-morbidities: CKD 3, HTN for over 20 years, DM2 for about 10 years not on insulin, hyperlipidemia, obesity BMI 31.9.  She lives in [place], [place] with her daughter. She can walk w/o chest pain or SOB. She does get tired at the end of the day after baby sitting her grand daughter. No dizziness, falls, LOC. No problems walking from parking lot to the clinic. She sees Dr Arias in [facility] who obtained PET scan which showed large areas of reversible ischemia in LAD and Lcx territories. Her Cr is 1.3 with GFR 45.  I discussed angioplasty of the circumflex coronary artery and obtuse marginal bifurcation stenosis which is the sole remaining artery to the myocardium which fills both a totally occluded LAD  and a totally occluded RCA.  She has not seen a surgeon and I will refer her for surgical consultation for possible bypass surgery or backup if high-risk angioplasty is performed.  She understands the risk and I discussed this with she and her daughter.    Manoj Clay

## 2022-12-13 NOTE — PROGRESS NOTES
Clinic Note  12/13/2022      Subjective:       Patient ID:  Lili is a 67 y.o. female being seen for an established visit.    Chief Complaint: No chief complaint on file.    HPI  Patient is a 67 y.o female with h/o CAD s/p MI in 1/2022 s/p LHC with PCI in [facility] with [facility] showing mv CAD, iCM with EF 25%, CKD3, HTN, DMII, obesity who presents to the [facility] for follow up.  Patient of Dr. Last. PET stress was obtained by [facility] staff after pt complaining of cp with exertion. It revealed large areas of reversible ischemia in LAD and LCX territories.  Saw Dr. Clay in clinic in 11/22/22 for in light of positive stress.plan was to obtain films from [facility] and plan for C +/- PCI.     PET on 10/18/22: there is a small perfusion abnormality ( <5%), mild intesnity mid to apical ant and anteroseptal abn in distribution of the LAD.  Second small perfusion abn (<5%), in basal to apical inferior and inferolater region in distribution of LCX and RCA terriotry. After pharm stress, this pefusion abn is larger and more severe involving 37% of LV myocardium and is indicative of ischemia with underlying infarct.    TTE on 11/8/21 with EF 25%, segmental LV WMA  Review of Systems   Constitutional:  Negative for chills, fever and weight loss.   HENT: Negative.     Eyes:  Negative for blurred vision.   Respiratory:  Negative for cough and shortness of breath.    Cardiovascular:  Negative for chest pain and palpitations.   Gastrointestinal:  Negative for abdominal pain, blood in stool, constipation, diarrhea, melena, nausea and vomiting.   Musculoskeletal:  Negative for myalgias.   Skin: Negative.    Neurological:  Negative for dizziness, loss of consciousness and weakness.     Past Medical History:   Diagnosis Date    Acid reflux     Coronary artery disease     Diabetes     Heart murmur     Hypertension        Family History   Problem Relation Age of Onset    Heart disease Mother     Diabetes Mother     Hypertension Mother      Diabetes Father     Diabetes Sister         reports that she has never smoked. She has never used smokeless tobacco. She reports that she does not currently use alcohol. She reports that she does not use drugs.    Medication List with Changes/Refills   Current Medications    ASPIRIN (ECOTRIN) 81 MG EC TABLET    Adult Low Dose Aspirin 81 mg tablet,delayed release   Take 1 tablet every day by oral route.    ATORVASTATIN (LIPITOR) 80 MG TABLET    Take 80 mg by mouth once daily.    BRILINTA 90 MG TABLET    90 mg 2 (two) times daily.    BUMETANIDE (BUMEX) 0.5 MG TAB    Take 1 tablet (0.5 mg total) by mouth once daily.    EMPAGLIFLOZIN (JARDIANCE) 25 MG TABLET    Take 1 tablet (25 mg total) by mouth once daily.    ERGOCALCIFEROL (ERGOCALCIFEROL) 50,000 UNIT CAP    Take 1 capsule (50,000 Units total) by mouth every 7 days.    LANCETS 32 GAUGE MISC    1 lancet by Misc.(Non-Drug; Combo Route) route 3 (three) times daily.    METFORMIN (GLUCOPHAGE) 500 MG TABLET    Take 1 tablet (500 mg total) by mouth once daily. Take every evening    METOPROLOL SUCCINATE (TOPROL-XL) 100 MG 24 HR TABLET    Take 1 tablet (100 mg total) by mouth 2 (two) times daily.    PANTOPRAZOLE (PROTONIX) 40 MG TABLET    Take 40 mg by mouth once daily.    RANOLAZINE (RANEXA) 500 MG TB12    Take 500 mg by mouth 2 (two) times daily.    SACUBITRIL-VALSARTAN (ENTRESTO)  MG PER TABLET    Take 1 tablet by mouth 2 (two) times daily.    SPIRONOLACTONE (ALDACTONE) 25 MG TABLET    Take 1 tablet (25 mg total) by mouth once daily.    TEMAZEPAM (RESTORIL) 15 MG CAP    Take 15 mg by mouth every evening.     Review of patient's allergies indicates:  No Known Allergies    Patient Active Problem List   Diagnosis    Dyspnea on exertion    Other hyperlipidemia    Class 1 obesity in adult    Type 2 diabetes mellitus without complication, without long-term current use of insulin    Edema    Heart failure with reduced ejection fraction    Abnormal cardiovascular stress  "test    Ischemic cardiomyopathy    Primary hypertension    Left ventricular enlargement    LVH (left ventricular hypertrophy)    Left atrial enlargement    Nonrheumatic aortic valve insufficiency    Nonrheumatic mitral valve regurgitation    Pulmonary insufficiency    Aortic atherosclerosis    Coronary artery disease           Objective:      There were no vitals taken for this visit.  Estimated body mass index is 32.66 kg/m² as calculated from the following:    Height as of 11/22/22: 5' 4" (1.626 m).    Weight as of 11/22/22: 86.3 kg (190 lb 4.1 oz).  Physical Exam  Constitutional:       General: She is not in acute distress.     Appearance: She is not diaphoretic.   HENT:      Head: Normocephalic and atraumatic.      Mouth/Throat:      Pharynx: No oropharyngeal exudate.   Eyes:      General: No scleral icterus.     Conjunctiva/sclera: Conjunctivae normal.      Pupils: Pupils are equal, round, and reactive to light.   Neck:      Vascular: No JVD.      Trachea: No tracheal deviation.   Cardiovascular:      Rate and Rhythm: Normal rate and regular rhythm.      Heart sounds: No murmur heard.    No friction rub. No gallop.   Pulmonary:      Effort: Pulmonary effort is normal. No respiratory distress.      Breath sounds: Normal breath sounds. No wheezing.   Chest:      Chest wall: No tenderness.   Abdominal:      General: Bowel sounds are normal. There is no distension.      Tenderness: There is no abdominal tenderness. There is no rebound.   Musculoskeletal:         General: No deformity. Normal range of motion.      Cervical back: Normal range of motion and neck supple.   Lymphadenopathy:      Cervical: No cervical adenopathy.   Skin:     General: Skin is warm and dry.   Neurological:      Mental Status: She is alert and oriented to person, place, and time.      Cranial Nerves: No cranial nerve deficit.   Psychiatric:         Mood and Affect: Affect normal.         Assessment and Plan:     The patient location is: " home  The chief complaint leading to consultation is: CAD    Visit type: audio only    Face to Face time with patient: 20   45 minutes of total time spent on the encounter, which includes face to face time and non-face to face time preparing to see the patient (eg, review of tests), Obtaining and/or reviewing separately obtained history, Documenting clinical information in the electronic or other health record, Independently interpreting results (not separately reported) and communicating results to the patient/family/caregiver, or Care coordination (not separately reported).         Each patient to whom he or she provides medical services by telemedicine is:  (1) informed of the relationship between the physician and patient and the respective role of any other health care provider with respect to management of the patient; and (2) notified that he or she may decline to receive medical services by telemedicine and may withdraw from such care at any time.    Notes:   Lili Gerardo is a 67 y.o.  female with a past medical history remarkable for MI in 1/2022 s/[facility] and PCI in [facility] with [facility] showing multivessel coronary artery disease, ischemic cardiomyopathy/HFrEF with EF 25%  Co-morbidities: CKD 3, HTN for over 20 years, DM2 for about 10 years not on insulin, hyperlipidemia, obesity BMI 31.9.  She lives in [place], [place] with her daughter. She can walk w/o chest pain or SOB. She does get tired at the end of the day after baby sitting her grand daughter. No dizziness, falls, LOC. No problems walking from parking lot to the clinic. She sees Dr Arias in [facility] who obtained PET scan which showed large areas of reversible ischemia in LAD and Lcx territories. Her Cr is 1.3 with GFR 45.  I discussed angioplasty of the circumflex coronary artery and obtuse marginal bifurcation stenosis which is the sole remaining artery to the myocardium which fills both a totally occluded LAD and a totally occluded  RCA.  She has not seen a surgeon and I will refer her for surgical consultation for possible bypass surgery or backup if high-risk angioplasty is performed.  She understands the risk and I discussed this with she and her daughter.

## 2022-12-14 DIAGNOSIS — I25.10 ATHEROSCLEROSIS OF NATIVE CORONARY ARTERY OF NATIVE HEART WITHOUT ANGINA PECTORIS: Primary | ICD-10-CM

## 2023-01-03 NOTE — PROGRESS NOTES
Subjective:      Patient ID: Lili Gerardo is a 67 y.o. female.    Chief Complaint: No chief complaint on file.      HPI:  Lili Gerardo is a 67 y.o. female who presents for surgical evaluation of CAD. Medical conditions include CAD s/p MI in 1/2022 s/p LHC with PCI in [facility] with [facility] (balloon angioplasty to the CX, and YESICA to OM1), iCM with EF 25%, CKD3, HTN, DMII, obesity. Patient of Dr. Last. PET stress was obtained by [facility] staff after pt complaining of chest pain with exertion. It revealed large areas of reversible ischemia in LAD and LCX territories.Patient reports that since she received stents she is no longer short of breath. Says that she can cook, clean, go shopping, and watch her 10 month old grandchild every day without issue. Is fatigued at the end of the day. Reports her mother had bypass in her 60s. Her A1C is down to 5 but does not check her sugars regularly. Is compliant with medications. Denies chest pain. No prior strokes, seizures, blood clots, or sternotomies. No smoking or drinking history.     Current Outpatient Medications   Medication Instructions    aspirin (ECOTRIN) 81 MG EC tablet Adult Low Dose Aspirin 81 mg tablet,delayed release   Take 1 tablet every day by oral route.    atorvastatin (LIPITOR) 80 mg, Oral, Daily    BRILINTA 90 mg, 2 times daily    bumetanide (BUMEX) 0.5 mg, Oral, Daily    ergocalciferol (ERGOCALCIFEROL) 50,000 Units, Oral, Every 7 days    JARDIANCE 25 mg, Oral, Daily    lancets 32 gauge Misc 1 lancet, Misc.(Non-Drug; Combo Route), 3 times daily    metFORMIN (GLUCOPHAGE) 500 mg, Oral, Daily, Take every evening    metoprolol succinate (TOPROL-XL) 100 mg, Oral, 2 times daily    pantoprazole (PROTONIX) 40 mg, Oral, Daily    ranolazine (RANEXA) 500 mg, Oral, 2 times daily    sacubitriL-valsartan (ENTRESTO)  mg per tablet 1 tablet, Oral, 2 times daily    spironolactone (ALDACTONE) 25 mg, Oral, Daily    temazepam (RESTORIL) 15 mg, Oral, Nightly  "        Family and social history reviewed    Review of patient's allergies indicates:  No Known Allergies  Past Medical History:   Diagnosis Date    Acid reflux     Coronary artery disease     Diabetes     Heart murmur     Hypertension      Past Surgical History:   Procedure Laterality Date    CORONARY STENT PLACEMENT      GALLBLADDER SURGERY      removed     Family History       Problem Relation (Age of Onset)    Diabetes Mother, Father, Sister    Heart disease Mother    Hypertension Mother          Social History     Socioeconomic History    Marital status:    Tobacco Use    Smoking status: Never    Smokeless tobacco: Never    Tobacco comments:     "smoked a little as a teen but not consistent"   Substance and Sexual Activity    Alcohol use: Not Currently    Drug use: Never    Sexual activity: Never   Social History Narrative    ** Merged History Encounter **            Current medications Reviewed    Review of Systems   Constitutional:  Positive for fatigue.   HENT:  Negative for nosebleeds.    Eyes:  Negative for visual disturbance.   Respiratory:  Negative for shortness of breath.    Cardiovascular:  Negative for chest pain.   Gastrointestinal:  Negative for nausea.   Musculoskeletal:  Negative for gait problem.   Skin:  Negative for color change.   Neurological:  Negative for seizures.   Hematological:  Does not bruise/bleed easily.   Psychiatric/Behavioral:  Negative for sleep disturbance.    Objective:   Physical Exam  Constitutional:       General: She is not in acute distress.  HENT:      Head: Normocephalic and atraumatic.   Eyes:      Pupils: Pupils are equal, round, and reactive to light.   Cardiovascular:      Rate and Rhythm: Normal rate.   Pulmonary:      Effort: Pulmonary effort is normal. No respiratory distress.   Abdominal:      General: There is no distension.   Musculoskeletal:         General: Normal range of motion.      Cervical back: Normal range of motion.   Skin:     Coloration: " Skin is not pale.   Neurological:      General: No focal deficit present.      Mental Status: She is alert.   Psychiatric:         Mood and Affect: Mood normal.         Behavior: Behavior normal.       Diagnostic Results: reviewed   [facility] images reviewed by Dr. Abdalla     6 Minute Walk 6/15/22  Six minute walk distance is 243.84 meters (800 feet) with very light dyspnea.  During exercise, there was no significant desaturation while breathing room air.  Both blood pressure and heart rate remained stable with walking.  The patient reported non-pulmonary symptoms during exercise.  Significant exercise impairment is likely due to subjective symptoms.  No previous study performed.    Cardiac PET 10/18/22    There are two significant perfusion abnormalities.    Perfusion Abnormality #1 - There is a very small (< 5%) sized, mild intensity mid to apical anterior and anteroseptal resting perfusion abnormality involving 5% of LV myocardium in the distribution of the LAD. After pharmacologic stress, this perfusion abnormality is larger and more severe involving 20% of the LV myocardium, indicative of ischemia with underlying scar.    Perfusion Abnormality #2 - There is a very small (<5%) sized, mild intensity basal to apical inferior and inferolateral resting perfusion abnormality involving 2% of LV myocardium in the distribution of the LCX and RCA territory. After pharmacologic stress, this perfusion abnormality is larger and more severe involving 37% of the LV myocardium indicative of ischemia with underlying infarct.    There are no other significant perfusion abnormalities.    The whole heart absolute myocardial perfusion values averaged 0.43 cc/min/g at rest, which is reduced; 0.61 cc/min/g at stress, which is severely reduced; and CFR is 1.37 , which is moderately reduced.    CT attenuation images demonstrate severe diffuse coronary calcifications and mild diffuse aortic calcifications.    The gated perfusion images  showed an ejection fraction of 40% at rest and 36% during stress. A normal ejection fraction is greater than 53%.    There is mild global hypokinesis at rest and moderate global hypokinesis during stress .    The LV cavity size is normal at rest and mildly enlarged at stress.    The EKG portion of this study is negative for ischemia.    There were no arrhythmias during stress.    The patient reported no chest pain during the stress test.    There are no prior studies for comparison.     This is a high risk study with reversible perfusion abnormalities and severely reduced coronary flow capacity in a multivessel distribution involving 60% of the ventricular myocardium. Ischemic dilatation of the LV is present.      Assessment:   CAD  Plan:   Very pleasant 67yoF. I reviwed her imaging and dont believe her LAD is large enough for bypass. Her RCA is not large either. I think stenting as a bridge to see if her EF improves is a good idea. She is having no chest pain or shortness of breath at this time. I do think given that there is no LAD to bypass the benefit of surgery is minimal. If she ends up going down the path of transplant or LVAD having a virgin sternum would be helpful. I will discuss this case with Dr Clay when he returns on Monday

## 2023-01-05 ENCOUNTER — OFFICE VISIT (OUTPATIENT)
Dept: CARDIOTHORACIC SURGERY | Facility: CLINIC | Age: 68
End: 2023-01-05
Payer: MEDICARE

## 2023-01-05 VITALS
HEART RATE: 77 BPM | BODY MASS INDEX: 32.61 KG/M2 | DIASTOLIC BLOOD PRESSURE: 60 MMHG | WEIGHT: 190 LBS | SYSTOLIC BLOOD PRESSURE: 115 MMHG

## 2023-01-05 DIAGNOSIS — I25.10 ATHEROSCLEROSIS OF NATIVE CORONARY ARTERY OF NATIVE HEART WITHOUT ANGINA PECTORIS: ICD-10-CM

## 2023-01-05 PROCEDURE — 99205 PR OFFICE/OUTPT VISIT, NEW, LEVL V, 60-74 MIN: ICD-10-PCS | Mod: S$PBB,,, | Performed by: STUDENT IN AN ORGANIZED HEALTH CARE EDUCATION/TRAINING PROGRAM

## 2023-01-05 PROCEDURE — 99999 PR PBB SHADOW E&M-EST. PATIENT-LVL II: CPT | Mod: PBBFAC,,, | Performed by: STUDENT IN AN ORGANIZED HEALTH CARE EDUCATION/TRAINING PROGRAM

## 2023-01-05 PROCEDURE — 99212 OFFICE O/P EST SF 10 MIN: CPT | Mod: PBBFAC | Performed by: STUDENT IN AN ORGANIZED HEALTH CARE EDUCATION/TRAINING PROGRAM

## 2023-01-05 PROCEDURE — 99999 PR PBB SHADOW E&M-EST. PATIENT-LVL II: ICD-10-PCS | Mod: PBBFAC,,, | Performed by: STUDENT IN AN ORGANIZED HEALTH CARE EDUCATION/TRAINING PROGRAM

## 2023-01-05 PROCEDURE — 99205 OFFICE O/P NEW HI 60 MIN: CPT | Mod: S$PBB,,, | Performed by: STUDENT IN AN ORGANIZED HEALTH CARE EDUCATION/TRAINING PROGRAM

## 2023-04-12 ENCOUNTER — PATIENT MESSAGE (OUTPATIENT)
Dept: TRANSPLANT | Facility: CLINIC | Age: 68
End: 2023-04-12
Payer: MEDICARE

## 2023-05-01 NOTE — PROGRESS NOTES
ADVANCED HEART FAILURE AND TRANSPLANTATION OFFICE VISIT    CHIEF COMPLAINT:  Follow-up HF    HISTORY OF PRESENT ILLNESS:  Lili Gerardo is a 67 y.o.  female with a past medical history remarkable for multivessel coronary artery disease, ischemic cardiomyopathy/HFrEF presenting to [facility] for follow-up    Co-morbidities: HTN for over 20 years, DM2 for about 10 years not on insulin, hyperlipidemia, obesity BMI     She established care with [facility] 6/14/2022 at which time we wanted to review outside cath films with interventional cardiology and cardiothoracic surgery as well as repeat viability testing (PET stress done prior to STEMI) for consideration of revascularization. The images are not uploaded and pending from [facility].     At visit 8/2022, we recommended obtaining cath films and repeating PET stress here, which showed a high risk study with reversible perfusion abnormalities and severely reduced coronary flow capacity in a multivessel distribution involving 60% of the ventricular myocardium. Ischemic dilatation of the LV is present. She was referred to interventional cardiology with plans for possible intervention to CX and OM that are only vessels for revascularization by PCI providing collaterals to occluded LAD and RCA. She is pending CTS evaluation. In the interim, she underwent single lead ICD 8/31/2022 locally.     Last seen 11/2022 at which time increased Toprol to 100 mg BID and referred to CTS who did not think she had distal targets with small vessels for consideration of surgical revascularization.    Since her last visit, she has noted fatigue often. Watches her grandchild in AM and takes nap in afternoon. Trying to diet with reducing portions but also does not eat as much as previously as she had been doing over the last few months. Occasional nausea. Denies any SOB. Denies orthopnea, PND, bendopnea, abdominal distension, early satiety, lower extremity edema, chest  discomfort, presyncope, palpitations, or syncope. Denies adverse bleeding, no hematochezia/melena/hematuria/hematemesis.    Walks daily, does daily shopping, trying to diet as she gained 10 lbs over the holidays. Weight at initial visit 83.5 kg => 84.2 kg => 84.3 kg => 88.5 kg. Completed cardiac rehab.    HF hospitalizations: none  ICD discharges: none    Current diuretic regimen: Bumex 0.5 mg daily    GDMT: Toprol 100 mg BID, Entresto  mg BID, Aldactone 25 mg daily (but maybe taking 12.5 mg daily), Jardiance 25 mg daily  She is taking Ranexa 500 mg BID  Aspirin 81 mg daily and Brilinta 90 mg BID, Lipitor 80 mg qHS    Cardiac history:  States she was born with heart issues. Her mother had taken her to follow-ups at the age of 3 with concern for possible pulmonary stenosis. Saw Dr. Ellington locally at age 14 with no issues and no intervention or medications. Diagnosed with coronary artery disease when she presented with an MI (appears to be inferior STEMI by ECG 1/29/2022 with symptoms of shortness of breath. She was admitted and underwent coronary angiography 2/3/2022 noting 30-40% LM, 90%prox LAD occluded mid with distal collateral filling, D1 80-90%, 95% CX, 95% OM1, RCA occluded proximally with large RV branch giving collaterals to mid-distal LAD. She did undergo balloon angioplasty to the CX, and YESICA to OM1 and unable to intervene on LAD noted as . She was told she was not a candidate for bypass surgery locally.     6MWT at initial visit ambulated 244 meters with fatigue but did not stop and with no desaturations.   Myocardial infarction: +  Revascularization: +  CVA/TIA: neg  VTE: neg  AF/arrhythmias: neg  Obesity: +  Hyperlipidemia: +  DM: +  PAD: no per OSH testing    Cardiac Data: Transthoracic Echo: 5/6/2022 OSH: LVEDD 53 mm, EF 20-25% with RWMA and apical/inferior akinesis, normal RV size and function, mild MR, trace AR    Results for orders placed during the hospital encounter of 11/08/21 LVEDD  70  · The left ventricle is severely enlarged with severe eccentric hypertrophy and severely decreased systolic function.  · The estimated ejection fraction is about 25%.  · The quantitatively derived ejection fraction is 24%.  · There are segmental left ventricular wall motion abnormalities.  · Left ventricular diastolic dysfunction.  · Severe left atrial enlargement.  · Normal right ventricular size with normal right ventricular systolic function.  · Plaque present in the ascending aorta.  · Mild aortic regurgitation.  · Mild mitral regurgitation.  · The estimated PA systolic pressure is 16 mmHg.  · There is no pulmonary hypertension.  · Moderate pulmonic regurgitation.  · Normal central venous pressure (3 mmHg).  · Trivial pericardial effusion.    PET stress 10/18/2022:   There are two significant perfusion abnormalities.    Perfusion Abnormality #1 - There is a very small (< 5%) sized, mild intensity mid to apical anterior and anteroseptal resting perfusion abnormality involving 5% of LV myocardium in the distribution of the LAD. After pharmacologic stress, this perfusion abnormality is larger and more severe involving 20% of the LV myocardium, indicative of ischemia with underlying scar.    Perfusion Abnormality #2 - There is a very small (<5%) sized, mild intensity basal to apical inferior and inferolateral resting perfusion abnormality involving 2% of LV myocardium in the distribution of the LCX and RCA territory. After pharmacologic stress, this perfusion abnormality is larger and more severe involving 37% of the LV myocardium indicative of ischemia with underlying infarct.    There are no other significant perfusion abnormalities.    The whole heart absolute myocardial perfusion values averaged 0.43 cc/min/g at rest, which is reduced; 0.61 cc/min/g at stress, which is severely reduced; and CFR is 1.37 , which is moderately reduced.    CT attenuation images demonstrate severe diffuse coronary calcifications  and mild diffuse aortic calcifications.    The gated perfusion images showed an ejection fraction of 40% at rest and 36% during stress. A normal ejection fraction is greater than 53%.    There is mild global hypokinesis at rest and moderate global hypokinesis during stress .    The LV cavity size is normal at rest and mildly enlarged at stress.    The EKG portion of this study is negative for ischemia.    There were no arrhythmias during stress.    The patient reported no chest pain during the stress test.    There are no prior studies for comparison.   This is a high risk study with reversible perfusion abnormalities and severely reduced coronary flow capacity in a multivessel distribution involving 60% of the ventricular myocardium. Ischemic dilatation of the LV is present.    Nuclear PET stress 12/10/2021:    Abnormal myocardial perfusion scan.    Evidence of edy-infarct ischemia in the apex, mid AW and the LW; also, definite very large myocardial injury (scar) by nuclear medicine imaging in the IS, IW, ILW, LW, mid-distal AW and the apex. Only the AS and the ALW are well perfused.    The EKG portion of this study is negative for ischemia.    The patient reported no chest pain during the stress test.    There were no arrhythmias during stress.    There are two significant perfusion abnormalities.    The gated perfusion images showed an ejection fraction of 22% post stress.    Defect #1 - There is a severe intensity, very large sized, fixed defect consistent with scar in the basal to apical inferior, inferolateral, inferoseptal and lateral wall(s).    Defect #2 - There is a large sized, severe intensity, fixed defect consistent with scar  in the mid to apical anterior wall and in the apex wall(s).    ECG 1/9/2022:  bpm with ST elevation inferiorly and ST depressions high lateral leads    Left Heart Catheterization: above    Right Heart Catheterization: none    Devices: single lead ICD  Generator - 91 [address]uan Wireles  Visia AF MRI VR SureScButtercoin Model#TFXK0G2           Serial#[id number]  Ventricular electrode - Medtronic Model#6935-65CM   Serial#[id number]    PAST MEDICAL HISTORY:  Past Medical History:   Diagnosis Date    Acid reflux     Coronary artery disease     Diabetes     Heart murmur     Hypertension        PAST SURGICAL HISTORY:  Past Surgical History:   Procedure Laterality Date    CORONARY STENT PLACEMENT      GALLBLADDER SURGERY      removed       SOCIAL HISTORY  Marital Status: single, 3 kids all with HTN  Occupation: retired, used to work as   Alcohol: never  Tobacco: in teens briefly  Marijuana: never  IV Drug Use: neg  Cocaine/meth: neg  Hx preeclampsia: none besides gestational HTN for one pregnancy    FAMILY HISTORY  No family history of early CAD or HF  Father with DM2 with complications  Mother with open heart surgery years ago possibly CABG  No SCD  First cousin with ICD for unclear reason    ALLERGIES:  Review of patient's allergies indicates:  No Known Allergies    MEDICATIONS  Current Outpatient Medications on File Prior to Visit   Medication Sig Dispense Refill    aspirin (ECOTRIN) 81 MG EC tablet Adult Low Dose Aspirin 81 mg tablet,delayed release   Take 1 tablet every day by oral route.      atorvastatin (LIPITOR) 80 MG tablet Take 80 mg by mouth once daily.      BRILINTA 90 mg tablet 90 mg 2 (two) times daily.      bumetanide (BUMEX) 0.5 MG Tab Take 1 tablet (0.5 mg total) by mouth once daily. 30 tablet 11    empagliflozin (JARDIANCE) 25 mg tablet Take 1 tablet (25 mg total) by mouth once daily. 90 tablet 3    ergocalciferol (ERGOCALCIFEROL) 50,000 unit Cap Take 1 capsule by mouth once a week 12 capsule 0    lancets 32 gauge Misc 1 lancet by Misc.(Non-Drug; Combo Route) route 3 (three) times daily. 200 each 3    metFORMIN (GLUCOPHAGE-XR) 500 MG ER 24hr tablet Take 1 tablet (500 mg total) by mouth daily with breakfast. 90 tablet 3    metoprolol succinate (TOPROL-XL) 100 MG 24 hr tablet Take 1  "tablet (100 mg total) by mouth 2 (two) times daily. 90 tablet 3    pantoprazole (PROTONIX) 40 MG tablet Take 40 mg by mouth once daily.      ranolazine (RANEXA) 500 MG Tb12 Take 500 mg by mouth 2 (two) times daily.      sacubitriL-valsartan (ENTRESTO)  mg per tablet Take 1 tablet by mouth 2 (two) times daily. 90 tablet 3    SITagliptin phosphate (JANUVIA) 100 MG Tab Take 1 tablet (100 mg total) by mouth once daily. 90 tablet 3    spironolactone (ALDACTONE) 25 MG tablet Take 1 tablet (25 mg total) by mouth once daily. 90 tablet 3    temazepam (RESTORIL) 15 mg Cap Take 15 mg by mouth every evening.      [DISCONTINUED] pravastatin (PRAVACHOL) 20 MG tablet Take 1 tablet (20 mg total) by mouth once daily. 90 tablet 3     No current facility-administered medications on file prior to visit.       REVIEW OF SYSTEMS  Review of Systems   Constitutional:  Negative for activity change, appetite change and fatigue.   Respiratory:  Positive for shortness of breath. Negative for chest tightness.    Cardiovascular:  Negative for chest pain and leg swelling.   Gastrointestinal:  Negative for abdominal distention, blood in stool and nausea.   Genitourinary:  Negative for difficulty urinating and hematuria.   Neurological:  Negative for syncope and light-headedness.   Hematological:  Does not bruise/bleed easily.   All other systems reviewed and are negative.    PHYSICAL EXAM:    Vitals:    05/02/23 0844 05/02/23 0845   BP: (!) 104/58 (!) 98/55   BP Location: Left arm Left arm   Patient Position: Sitting Standing   BP Method: Medium (Automatic) Medium (Automatic)   Pulse: 75 77   Weight: 88.5 kg (195 lb 1.7 oz)    Height: 5' 4" (1.626 m)       Body mass index is 33.49 kg/m².    GENERAL: Alert, NAD   HEENT: Anicteric sclerae  NECK: JVP visible above level of clavicle to low neck while sitting upright and estimated ~7 cmH20 reclining 45 degrees  CARDIOVASCULAR: Regular rate and rhythm. Normal S1, S2 no murmurs, rubs or " gallops.  PULMONARY: Lungs clear to auscultation bilaterally  ABDOMEN: Soft, nontender, nondistended. No guarding, no rebound, no hepatomegaly  EXTREMITIES: Warm and well-perfused. No clubbing, cyanosis or edema. No pre-sacral edema  VASCULAR: 2+ bilateral radial pulses  NEUROLOGIC: No focal deficits    LABS:    Labs pending  Lab Results   Component Value Date     02/02/2023    K 4.2 02/02/2023     02/02/2023    CO2 23 02/02/2023    BUN 34 (H) 02/02/2023    CREATININE 1.2 02/02/2023    CALCIUM 9.3 02/02/2023    ANIONGAP 11 02/02/2023    ESTGFRAFRICA >60.0 06/14/2022    EGFRNONAA 59.7 (A) 06/14/2022       Magnesium   Date Value Ref Range Status   11/15/2022 2.1 1.6 - 2.6 mg/dL Final       Lab Results   Component Value Date    WBC 8.94 11/15/2022    HGB 14.3 11/15/2022    HCT 44.2 11/15/2022    MCV 88 11/15/2022     11/15/2022         Lab Results   Component Value Date    INR 1.1 01/29/2022    INR 1.0 01/24/2022       BNP   Date Value Ref Range Status   11/15/2022 115 (H) 0 - 99 pg/mL Final     Comment:     Values of less than 100 pg/ml are consistent with non-CHF populations.   08/09/2022 148 (H) 0 - 99 pg/mL Final     Comment:     Values of less than 100 pg/ml are consistent with non-CHF populations.   06/14/2022 284 (H) 0 - 99 pg/mL Final     Comment:     Values of less than 100 pg/ml are consistent with non-CHF populations.         IMPRESSION:    NYHA Class III  ACC/AHA Stage C  Costa profile A    1. Chronic combined systolic and diastolic heart failure    2. Ischemic cardiomyopathy    3. Coronary artery disease, unspecified vessel or lesion type, unspecified whether angina present, unspecified whether native or transplanted heart    4. Aortic atherosclerosis    5. Nonrheumatic aortic valve insufficiency    6. Nonrheumatic mitral valve regurgitation    7. LVH (left ventricular hypertrophy)    8. Primary hypertension    9. Type 2 diabetes mellitus without complication, without long-term  current use of insulin        PLAN:    Unfortunately, she was to return after 1 month for follow-up in December but was seen today due to issues at home with earlier follow-up. Start expedited advanced therapy evaluation while discussing with interventional possible intervention to CX and OM. She is not a candidate for surgical revascularization per CTS evaluation so would determine candidacy for advanced therapies if unable to wean off suspected percutaneous MCS that may be needed at time of high risk PCI.    Endocrinology follows locally, will need better DM2 control given rise in A1c from 5.8 to 7.4%    Recommend 2 gram sodium restriction and 1500 mL fluid restriction.  Encourage physical activity with graded exercise program.  Requested patient to weigh themselves daily, and to notify us if their weight increases by more than 3 lbs in 1 day or 5 lbs in 1 week.     Pt to call us with more shortness of breath, swelling or unexpected weight changes, fever, chills, bloody or black bowel movements, or other concerns.    F/U 1 month after repeat echo, CPX, RHC, screening CT.     ADDENDUM: Repeat echo today with recovery of LV function. No need for further advanced therapy evaluation. Will discuss with interventional cardiology regarding thoughts on complex PCI.    Electronically signed by:   Mahogany Arias   05/01/2023 11:52 AM

## 2023-05-02 ENCOUNTER — DOCUMENTATION ONLY (OUTPATIENT)
Dept: TRANSPLANT | Facility: CLINIC | Age: 68
End: 2023-05-02
Payer: MEDICARE

## 2023-05-02 ENCOUNTER — TELEPHONE (OUTPATIENT)
Dept: TRANSPLANT | Facility: CLINIC | Age: 68
End: 2023-05-02

## 2023-05-02 ENCOUNTER — OFFICE VISIT (OUTPATIENT)
Dept: TRANSPLANT | Facility: CLINIC | Age: 68
End: 2023-05-02
Payer: MEDICARE

## 2023-05-02 ENCOUNTER — HOSPITAL ENCOUNTER (OUTPATIENT)
Dept: CARDIOLOGY | Facility: HOSPITAL | Age: 68
Discharge: HOME | End: 2023-05-02
Attending: INTERNAL MEDICINE
Payer: MEDICARE

## 2023-05-02 ENCOUNTER — TELEPHONE (OUTPATIENT)
Dept: TRANSPLANT | Facility: CLINIC | Age: 68
End: 2023-05-02
Payer: MEDICARE

## 2023-05-02 VITALS
SYSTOLIC BLOOD PRESSURE: 81 MMHG | BODY MASS INDEX: 33.29 KG/M2 | WEIGHT: 195 LBS | HEART RATE: 74 BPM | DIASTOLIC BLOOD PRESSURE: 58 MMHG | HEIGHT: 64 IN

## 2023-05-02 VITALS
HEART RATE: 77 BPM | DIASTOLIC BLOOD PRESSURE: 55 MMHG | BODY MASS INDEX: 33.31 KG/M2 | HEIGHT: 64 IN | WEIGHT: 195.13 LBS | SYSTOLIC BLOOD PRESSURE: 98 MMHG

## 2023-05-02 VITALS
BODY MASS INDEX: 33.29 KG/M2 | WEIGHT: 195 LBS | SYSTOLIC BLOOD PRESSURE: 95 MMHG | DIASTOLIC BLOOD PRESSURE: 58 MMHG | HEIGHT: 64 IN | HEART RATE: 78 BPM

## 2023-05-02 DIAGNOSIS — I51.7 LVH (LEFT VENTRICULAR HYPERTROPHY): ICD-10-CM

## 2023-05-02 DIAGNOSIS — I10 PRIMARY HYPERTENSION: ICD-10-CM

## 2023-05-02 DIAGNOSIS — I25.5 ISCHEMIC CARDIOMYOPATHY: ICD-10-CM

## 2023-05-02 DIAGNOSIS — I25.10 CORONARY ARTERY DISEASE, UNSPECIFIED VESSEL OR LESION TYPE, UNSPECIFIED WHETHER ANGINA PRESENT, UNSPECIFIED WHETHER NATIVE OR TRANSPLANTED HEART: ICD-10-CM

## 2023-05-02 DIAGNOSIS — I35.1 NONRHEUMATIC AORTIC VALVE INSUFFICIENCY: ICD-10-CM

## 2023-05-02 DIAGNOSIS — I34.0 NONRHEUMATIC MITRAL VALVE REGURGITATION: ICD-10-CM

## 2023-05-02 DIAGNOSIS — I70.0 AORTIC ATHEROSCLEROSIS: ICD-10-CM

## 2023-05-02 DIAGNOSIS — I50.42 CHRONIC COMBINED SYSTOLIC AND DIASTOLIC HEART FAILURE: Primary | ICD-10-CM

## 2023-05-02 DIAGNOSIS — I50.42 CHRONIC COMBINED SYSTOLIC AND DIASTOLIC HEART FAILURE: ICD-10-CM

## 2023-05-02 DIAGNOSIS — E11.9 TYPE 2 DIABETES MELLITUS WITHOUT COMPLICATION, WITHOUT LONG-TERM CURRENT USE OF INSULIN: ICD-10-CM

## 2023-05-02 LAB
ASCENDING AORTA: 3.43 CM
AV INDEX (PROSTH): 0.73
AV MEAN GRADIENT: 2 MMHG
AV PEAK GRADIENT: 4 MMHG
AV VALVE AREA: 2.25 CM2
AV VELOCITY RATIO: 0.79
BSA FOR ECHO PROCEDURE: 2 M2
CV ECHO LV RWT: 0.34 CM
CV STRESS BASE HR: 74 BPM
DIASTOLIC BLOOD PRESSURE: 58 MMHG
DOP CALC AO PEAK VEL: 1.01 M/S
DOP CALC AO VTI: 18.44 CM
DOP CALC LVOT AREA: 3.1 CM2
DOP CALC LVOT DIAMETER: 1.98 CM
DOP CALC LVOT PEAK VEL: 0.8 M/S
DOP CALC LVOT STROKE VOLUME: 41.58 CM3
DOP CALCLVOT PEAK VEL VTI: 13.51 CM
E WAVE DECELERATION TIME: 147.06 MSEC
E/A RATIO: 0.56
E/E' RATIO: 8.6 M/S
ECHO LV POSTERIOR WALL: 0.86 CM (ref 0.6–1.1)
EJECTION FRACTION: 55 %
FRACTIONAL SHORTENING: 30 % (ref 28–44)
INTERVENTRICULAR SEPTUM: 0.96 CM (ref 0.6–1.1)
LA MAJOR: 4.63 CM
LA MINOR: 4.8 CM
LA WIDTH: 4.48 CM
LEFT ATRIUM SIZE: 4.16 CM
LEFT ATRIUM VOLUME INDEX MOD: 40.6 ML/M2
LEFT ATRIUM VOLUME INDEX: 38.5 ML/M2
LEFT ATRIUM VOLUME MOD: 78.76 CM3
LEFT ATRIUM VOLUME: 74.67 CM3
LEFT INTERNAL DIMENSION IN SYSTOLE: 3.52 CM (ref 2.1–4)
LEFT VENTRICLE DIASTOLIC VOLUME INDEX: 61.08 ML/M2
LEFT VENTRICLE DIASTOLIC VOLUME: 118.5 ML
LEFT VENTRICLE MASS INDEX: 83 G/M2
LEFT VENTRICLE SYSTOLIC VOLUME INDEX: 26.6 ML/M2
LEFT VENTRICLE SYSTOLIC VOLUME: 51.65 ML
LEFT VENTRICULAR INTERNAL DIMENSION IN DIASTOLE: 5 CM (ref 3.5–6)
LEFT VENTRICULAR MASS: 160.52 G
LV LATERAL E/E' RATIO: 8.6 M/S
LV SEPTAL E/E' RATIO: 8.6 M/S
MV A" WAVE DURATION": 5.71 MSEC
MV PEAK A VEL: 0.77 M/S
MV PEAK E VEL: 0.43 M/S
MV STENOSIS PRESSURE HALF TIME: 42.65 MS
MV VALVE AREA P 1/2 METHOD: 5.16 CM2
OHS CV CPX 1 MINUTE RECOVERY HEART RATE: 96 BPM
OHS CV CPX 85 PERCENT MAX PREDICTED HEART RATE MALE: 126
OHS CV CPX DATA GRADE - PEAK: 2.6
OHS CV CPX DATA O2 SAT - PEAK: 98
OHS CV CPX DATA O2 SAT - REST: 96
OHS CV CPX DATA SPEED - PEAK: 1.7
OHS CV CPX DATA TIME - PEAK: 4.1
OHS CV CPX DATA VE/VCO2 - PEAK: 42
OHS CV CPX DATA VE/VO2 - PEAK: 35
OHS CV CPX DATA VO2 - PEAK: 10.2
OHS CV CPX DATA VO2 - REST: 2.7
OHS CV CPX FEV1/FVC: 0.81
OHS CV CPX FORCED EXPIRATORY VOLUME: 1.53
OHS CV CPX FORCED VITAL CAPACITY (FVC): 1.9
OHS CV CPX HIGHEST VO: 27.5
OHS CV CPX MAX PREDICTED HEART RATE: 149
OHS CV CPX MAXIMAL VOLUNTARY VENTILATION (MVV) PREDICTED: 61.2
OHS CV CPX MAXIMAL VOLUNTARY VENTILATION (MVV): 53
OHS CV CPX MAXIUMUM EXERCISE VENTILATION (VE MAX): 36.7
OHS CV CPX PATIENT AGE: 65
OHS CV CPX PATIENT HEIGHT IN: 64
OHS CV CPX PATIENT IS FEMALE AGE 11-19: 0
OHS CV CPX PATIENT IS FEMALE AGE GREATER THAN 19: 1
OHS CV CPX PATIENT IS FEMALE AGE LESS THAN 11: 0
OHS CV CPX PATIENT IS FEMALE: 1
OHS CV CPX PATIENT IS MALE AGE 11-25: 0
OHS CV CPX PATIENT IS MALE AGE GREATER THAN 25: 0
OHS CV CPX PATIENT IS MALE AGE LESS THAN 11: 0
OHS CV CPX PATIENT IS MALE GREATER THAN 18: 0
OHS CV CPX PATIENT IS MALE LESS THAN OR EQUAL TO 18: 0
OHS CV CPX PATIENT IS MALE: 0
OHS CV CPX PATIENT WEIGHT RETURNED IN OZ: 3120
OHS CV CPX PEAK DIASTOLIC BLOOD PRESSURE: 66 MMHG
OHS CV CPX PEAK HEAR RATE: 100 BPM
OHS CV CPX PEAK RATE PRESSURE PRODUCT: NORMAL
OHS CV CPX PEAK SYSTOLIC BLOOD PRESSURE: 127 MMHG
OHS CV CPX PERCENT BODY FAT: 34.6
OHS CV CPX PERCENT MAX PREDICTED HEART RATE ACHIEVED: 67
OHS CV CPX PREDICTED VO2: 27.5 ML/KG/MIN
OHS CV CPX RATE PRESSURE PRODUCT PRESENTING: 5994
OHS CV CPX REST PET CO2: 27
OHS CV CPX VE/VCO2 SLOPE: 37
PISA TR MAX VEL: 2.43 M/S
PULM VEIN S/D RATIO: 1.75
PV PEAK D VEL: 0.36 M/S
PV PEAK S VEL: 0.63 M/S
RA MAJOR: 3.97 CM
RA PRESSURE: 3 MMHG
RA WIDTH: 3.94 CM
RIGHT VENTRICULAR END-DIASTOLIC DIMENSION: 3.61 CM
RV TISSUE DOPPLER FREE WALL SYSTOLIC VELOCITY 1 (APICAL 4 CHAMBER VIEW): 15.82 CM/S
SINUS: 3.12 CM
STJ: 2.61 CM
STRESS ECHO POST EXERCISE DUR MIN: 4 MINUTES
STRESS ECHO POST EXERCISE DUR SEC: 6 SECONDS
SYSTOLIC BLOOD PRESSURE: 81 MMHG
TDI LATERAL: 0.05 M/S
TDI SEPTAL: 0.05 M/S
TDI: 0.05 M/S
TR MAX PG: 24 MMHG
TRICUSPID ANNULAR PLANE SYSTOLIC EXCURSION: 2.18 CM
TV REST PULMONARY ARTERY PRESSURE: 27 MMHG

## 2023-05-02 PROCEDURE — 93306 TTE W/DOPPLER COMPLETE: CPT | Mod: NTX

## 2023-05-02 PROCEDURE — 93306 TTE W/DOPPLER COMPLETE: CPT | Mod: 26,NTX,, | Performed by: INTERNAL MEDICINE

## 2023-05-02 PROCEDURE — 94621 CARDIOPULM EXERCISE TESTING: CPT | Mod: NTX

## 2023-05-02 PROCEDURE — 99999 PR PBB SHADOW E&M-EST. PATIENT-LVL V: ICD-10-PCS | Mod: PBBFAC,,, | Performed by: INTERNAL MEDICINE

## 2023-05-02 PROCEDURE — 99215 OFFICE O/P EST HI 40 MIN: CPT | Mod: PBBFAC,25 | Performed by: INTERNAL MEDICINE

## 2023-05-02 PROCEDURE — 99999 PR PBB SHADOW E&M-EST. PATIENT-LVL V: CPT | Mod: PBBFAC,,, | Performed by: INTERNAL MEDICINE

## 2023-05-02 PROCEDURE — 99214 PR OFFICE/OUTPT VISIT, EST, LEVL IV, 30-39 MIN: ICD-10-PCS | Mod: S$PBB,,, | Performed by: INTERNAL MEDICINE

## 2023-05-02 PROCEDURE — 99214 OFFICE O/P EST MOD 30 MIN: CPT | Mod: S$PBB,,, | Performed by: INTERNAL MEDICINE

## 2023-05-02 PROCEDURE — 93306 ECHO (CUPID ONLY): ICD-10-PCS | Mod: 26,NTX,, | Performed by: INTERNAL MEDICINE

## 2023-05-02 PROCEDURE — 94621 CARDIOPULMONARY EXERCISE TESTING (CUPID ONLY): ICD-10-PCS | Mod: 26,NTX,, | Performed by: INTERNAL MEDICINE

## 2023-05-02 PROCEDURE — 94621 CARDIOPULM EXERCISE TESTING: CPT | Mod: 26,NTX,, | Performed by: INTERNAL MEDICINE

## 2023-05-02 RX ORDER — VITAMIN B COMPLEX
CAPSULE ORAL
COMMUNITY
Start: 2023-03-02 | End: 2023-06-06

## 2023-05-02 NOTE — TELEPHONE ENCOUNTER
Per Dr. Vides, pt Echo looks good. At this time, she does not need to any advance options. Attempted to contact both the pt and her daughter, no answer. Left vm.

## 2023-05-02 NOTE — PROGRESS NOTES
At the request of Dr. Arias, I have been asked to meet patient and provide VAD education. Introduced self and reason for visit. Pt and Amie, caregiver AAAO.  Provided phase 1 written VAD education. Included in Phase 1 folder is the following:     Evaluation Eval for [facility]  [email or web address] Flyer  [employer] Text Message Education Videos  Pictures of examples of VADs     Explained the work up process including possible outcomes.     Explained to look over the entire contents and read Evaluation Eval for [facility] acknowledgement form. Encouraged patient to partake in [employer] Text Message Education Videos. Also explained that they should bring this folder with them to all clinic visits and if they are admitted to the hospital so that we can continue education as needed. Should there be any questions, please write them down and bring with you or feel free to call and we can talk on the phone. All questions answered to patient's satisfaction as evidence by verbal acknowledgement.

## 2023-05-02 NOTE — LETTER
May 2, 2023        Maciel Forbes  [address] [facility]  [place] [address]  Phone: [phone]  Fax: [phone]             [facility]  [address]  [facility] [address]  Phone: [phone]   Patient: Lili Gerardo   MR Number: [id number]   YOB: 1955   Date of Visit: 5/2/2023       Dear Dr. Maciel Forbes    Thank you for referring Lili Gerardo to me for evaluation. Attached you will find relevant portions of my assessment and plan of care.    If you have questions, please do not hesitate to call me. I look forward to following Lili Gerardo along with you.    Sincerely,    Mahogany Arias, DO    Enclosure    If you would like to receive this communication electronically, please contact [email or web address] or [phone] to request [employer] access.    [employer] is a tool which provides read-only access to select patient information with whom you have a relationship. Its easy to use and provides real time access to review your patients record including encounter summaries, notes, results, and demographic information.    If you feel you have received this communication in error or would no longer like to receive these types of communications, please e-mail [email or web address]

## 2023-05-02 NOTE — TELEPHONE ENCOUNTER
Advanced Heart Failure Options: Transplant/LVAD  PRE-EDUCATION BOOKLET NOTE:    Met with Lili Gerardo and had a brief discussion regarding the advanced heart failure evaluation process including options for heart transplantation and/or left ventricular assist device (LVAD) implantation.     Heart Transplant Educational Booklet reviewed and given to patient, which included the following handouts:  My Advanced Heart Failure Treatment Guide: Module 1  Pre Heart Transplant Coordinator Team Contact Information   Recipient Informed Consent   HIV Testing Information  Wellness Contract  UNOS Multiple Listing and Waiting Time Transfer  UNOS Heart Allocation   UNOS Toll Free Phone numbers    Significant History:  Tobacco history: no  Stroke history:  No  Thromboembolism history:  No  Prior sternotomies: No  ICD (if yes, indicate brand of device): Yes: Medtronic 8/22  Angiography (if yes, enter date and location): Yes: 1/22 [facility]   Blood transfusions (if yes, enter date and location): No  Cancer screenings -   Colonoscopy (if yes, enter date and location): No   Pap smear (if yes, enter date and location): No   Mammogram (if yes, enter date and location): No  COVID Vaccination: Yes   Dentist visit within the last year: No  Local Cardiologist:Dr. Forbes     Question and answer session was conducted.    Pt notified that Testing for communicable diseases will be completed as part of the Advanced Options evaluation.   Patient notified that HIV Testing is required for transplant evaluation and repeated at scheduled intervals before and after transplant.   Explained that education will be provided, results will be discussed, and the appropriate consults will be scheduled if needed.    Patient was advised to bring the educational packet to future appointments and/or admissions.   Patient was encouraged to contact the coordinator team with any questions or concerns.    Understanding verbalized.

## 2023-05-02 NOTE — PROGRESS NOTES
05/02/2023   Lili Gerardo  [address]        OUTPATIENT RIGHT HEART CATHETERIZATION INSTRUCTIONS     You have been scheduled for a procedure in the catheterization lab on 6/16/2023.      Please report to the Cardiology Waiting Area on the Third floor of the hospital and check in at 8:30 am.    You will then be taken to the SSCU (Short Stay Cardiac Unit) and prepared for your procedure. Please be aware that this is not the time of your procedure but the time you are to arrive. The procedures are scheduled on an hourly basis; however, emergency cases take precedence over all other cases.      You may eat a light meal before your procedure.    You may take your regular morning medications with water. If there are any medications that you should not take you will be instructed to hold them that morning.   If you are on Pradaxa, Eliquis or Coumadin , you can hold them 3 days prior to your procedure.  CALL US if you are on blood thinners for instructions. [phone]  Do not stop your Aspirin, Plavix, Effient, or Brilinta.    The procedure will take 30 minutes to perform. After the procedure, you will return to SSCU on the third floor of the hospital. Your family may remain in the room with you during this time.     You will be monitored for bleeding from the puncture site.  Your dressing may be removed the next day and dressed with a Band-Aid.  You may be able to be discharged home that same afternoon if there is someone to drive you home and there were no complications.     You may need to be admitted to the hospital after your procedure, so pack an overnight bag. Your doctor will determine, based on your progress, the date and time of your discharge. The results of your procedure will be discussed with you before you are discharged. Any further testing or procedures will be scheduled for you either before you leave or you will be called with these appointments.      If you should have any  questions, concerns, or need to change the date of your procedure, please call  Heart Transplant office and ask for your nurse. [phone]    Special Instructions:     THE ABOVE INSTRUCTIONS WERE GIVEN TO THE PATIENT VERBALLY AND THEY VERBALIZED UNDERSTANDING.  THEY DO NOT REQUIRE ANY SPECIAL NEEDS AND DO NOT HAVE ANY LEARNING BARRIERS.     Directions for Reporting to Cardiology Waiting Area in the Hospital  If you park in the Parking Garage:  Take elevators to the1st floor of the parking garage.  Continue past the gift shop, coffee shop, and piano.  Take a right and go to the gold elevators. (Elevator B)  Take the elevator to the 3rd floor.  Follow the arrow on the sign on the wall that says Cath Lab Registration/EP Lab Registration.  Follow the long hallway all the way around until you come to a big open area.  This is the registration area.  Check in at Reception Desk.     OR     If family is dropping you off:  Have them drop you off at the front of the Hospital under the green overhang.  Enter through the doors and take a right.  Take the E elevators to the 3rd floor Cardiology Waiting Area.  Check in at the Reception Desk in the waiting room.

## 2023-05-03 NOTE — TELEPHONE ENCOUNTER
I've spoken to Ms. Gerardo & Amie. Pt is very excited to hear the news of needing any advance options at this time. Will transfer to heart failure dept. Per Dr. Vides.     Care of patient is being transferred to CHF section from Preht section, per Dr. Vides.    Dx:CHF  Reason:does not fit advance option criteria  Pt is not on home inotrope therapy.  RTC 6/16/23

## 2023-05-19 ENCOUNTER — PATIENT MESSAGE (OUTPATIENT)
Dept: TRANSPLANT | Facility: CLINIC | Age: 68
End: 2023-05-19
Payer: MEDICARE

## 2023-05-22 ENCOUNTER — PATIENT MESSAGE (OUTPATIENT)
Dept: TRANSPLANT | Facility: CLINIC | Age: 68
End: 2023-05-22
Payer: MEDICARE

## 2023-05-22 NOTE — TELEPHONE ENCOUNTER
2:40 pm:    Sent the following message to pts dtr via [employer] in response to her 5/29/23 request:  Good afternoon Amie     I wanted to let you know we have completed the provider portion of the [employer] application and faxed all portions to them a short time ago  I have received a transmission confirmation all pages were successfully sent     If we can be of any further assistance, please let us know     Sincerely

## 2023-05-30 ENCOUNTER — PATIENT MESSAGE (OUTPATIENT)
Dept: TRANSPLANT | Facility: CLINIC | Age: 68
End: 2023-05-30
Payer: MEDICARE

## 2023-06-06 ENCOUNTER — DOCUMENTATION ONLY (OUTPATIENT)
Dept: TRANSPLANT | Facility: CLINIC | Age: 68
End: 2023-06-06
Payer: MEDICARE

## 2023-06-06 NOTE — PROGRESS NOTES
Patient removed from LVAD potential list d/t being moved to CHF section, will be re-consulted if patient needs advanced options.

## 2023-06-08 DIAGNOSIS — I25.5 ISCHEMIC CARDIOMYOPATHY: ICD-10-CM

## 2023-06-08 DIAGNOSIS — I50.20 HEART FAILURE WITH REDUCED EJECTION FRACTION: ICD-10-CM

## 2023-06-08 RX ORDER — SACUBITRIL AND VALSARTAN 97; 103 MG/1; MG/1
1 TABLET, FILM COATED ORAL 2 TIMES DAILY
Qty: 90 TABLET | Refills: 3 | Status: CANCELLED | OUTPATIENT
Start: 2023-06-08

## 2023-06-08 RX ORDER — SACUBITRIL AND VALSARTAN 97; 103 MG/1; MG/1
1 TABLET, FILM COATED ORAL 2 TIMES DAILY
Qty: 90 TABLET | Refills: 3 | Status: SHIPPED | OUTPATIENT
Start: 2023-06-08

## 2023-07-07 DIAGNOSIS — I25.5 ISCHEMIC CARDIOMYOPATHY: ICD-10-CM

## 2023-07-07 DIAGNOSIS — I50.20 HEART FAILURE WITH REDUCED EJECTION FRACTION: ICD-10-CM

## 2023-07-11 RX ORDER — BUMETANIDE 0.5 MG/1
TABLET ORAL
Qty: 90 TABLET | Refills: 3 | Status: SHIPPED | OUTPATIENT
Start: 2023-07-11

## 2023-07-26 DIAGNOSIS — I50.20 HEART FAILURE WITH REDUCED EJECTION FRACTION: ICD-10-CM

## 2023-07-26 DIAGNOSIS — I25.10 CORONARY ARTERY DISEASE, UNSPECIFIED VESSEL OR LESION TYPE, UNSPECIFIED WHETHER ANGINA PRESENT, UNSPECIFIED WHETHER NATIVE OR TRANSPLANTED HEART: ICD-10-CM

## 2023-07-26 DIAGNOSIS — I25.5 ISCHEMIC CARDIOMYOPATHY: ICD-10-CM

## 2023-07-27 RX ORDER — METOPROLOL SUCCINATE 100 MG/1
100 TABLET, EXTENDED RELEASE ORAL 2 TIMES DAILY
Qty: 60 TABLET | Refills: 0 | Status: SHIPPED | OUTPATIENT
Start: 2023-07-27

## 2023-08-22 ENCOUNTER — DOCUMENTATION ONLY (OUTPATIENT)
Dept: CARDIOLOGY | Facility: CLINIC | Age: 68
End: 2023-08-22
Payer: MEDICARE

## 2023-08-22 ENCOUNTER — OFFICE VISIT (OUTPATIENT)
Dept: CARDIOLOGY | Facility: CLINIC | Age: 68
End: 2023-08-22
Payer: MEDICARE

## 2023-08-22 VITALS
OXYGEN SATURATION: 96 % | BODY MASS INDEX: 33.91 KG/M2 | HEART RATE: 62 BPM | HEIGHT: 64 IN | DIASTOLIC BLOOD PRESSURE: 68 MMHG | SYSTOLIC BLOOD PRESSURE: 117 MMHG | WEIGHT: 198.63 LBS

## 2023-08-22 DIAGNOSIS — I25.10 CORONARY ARTERY DISEASE DUE TO LIPID RICH PLAQUE: Primary | ICD-10-CM

## 2023-08-22 DIAGNOSIS — I25.83 CORONARY ARTERY DISEASE DUE TO LIPID RICH PLAQUE: Primary | ICD-10-CM

## 2023-08-22 DIAGNOSIS — I50.42 CHRONIC COMBINED SYSTOLIC AND DIASTOLIC HEART FAILURE: Primary | ICD-10-CM

## 2023-08-22 PROCEDURE — 99999 PR PBB SHADOW E&M-EST. PATIENT-LVL IV: CPT | Mod: PBBFAC,,, | Performed by: INTERNAL MEDICINE

## 2023-08-22 PROCEDURE — 99214 OFFICE O/P EST MOD 30 MIN: CPT | Mod: PBBFAC | Performed by: INTERNAL MEDICINE

## 2023-08-22 PROCEDURE — 99999 PR PBB SHADOW E&M-EST. PATIENT-LVL IV: ICD-10-PCS | Mod: PBBFAC,,, | Performed by: INTERNAL MEDICINE

## 2023-08-22 PROCEDURE — 99214 PR OFFICE/OUTPT VISIT, EST, LEVL IV, 30-39 MIN: ICD-10-PCS | Mod: S$PBB,,, | Performed by: INTERNAL MEDICINE

## 2023-08-22 PROCEDURE — 99214 OFFICE O/P EST MOD 30 MIN: CPT | Mod: S$PBB,,, | Performed by: INTERNAL MEDICINE

## 2023-08-22 NOTE — PROGRESS NOTES
"  Subjective:    Patient ID:  Lili Gerardo is a 68 y.o. y.o. female who presents for evaluation Coronary Artery Disease      HPI three-vessel severe coronary artery disease with an ejection fraction of 20%, nonsurgical candidate, extremely high risk coronary angioplasty candidate who I personally turned down after seeing the patient 3 times in clinic recently looking at films performed in 2022 at [facility]  A schedule patient to see heart failure transplant service and repeat echocardiogram after optimal guideline directed medical therapy was instituted reveals an ejection fraction of 55%.  After long discussion with the patient and her daughter we all feel that it is prudent to perform repeat coronary angiography, repeat surgical evaluation and repeat interventional cardiology evaluation.  I plan to sign patient up for a diagnostic left heart catheterization through the right radial artery.    ROS  Patient's symptoms have improved markedly and she can now walk further than the parking garage to my clinic and she can buy groceries.     Objective:     /68 (BP Location: Right arm, Patient Position: Sitting, BP Method: Large (Automatic))   Pulse 62   Ht 5' 4" (1.626 m)   Wt 90.1 kg (198 lb 10.2 oz)   SpO2 96%   BMI 34.10 kg/m²     Physical Exam    Labs:     Lab Results   Component Value Date     05/30/2023    K 4.0 05/30/2023     05/30/2023    CO2 21 (L) 05/30/2023    BUN 23 05/30/2023    CREATININE 1.0 05/30/2023    ANIONGAP 10 05/30/2023     Lab Results   Component Value Date    HGBA1C 5.6 05/30/2023     Lab Results   Component Value Date    BNP 59 05/02/2023     (H) 11/15/2022     (H) 08/09/2022       Lab Results   Component Value Date    WBC 8.80 05/02/2023    HGB 13.3 05/02/2023    HCT 40.8 05/02/2023     05/02/2023    GRAN 5.8 05/02/2023    GRAN 66.4 05/02/2023     Lab Results   Component Value Date    CHOL 188 12/30/2021    HDL 44 12/30/2021    LDLCALC 109.4 " 12/30/2021    TRIG 173 (H) 12/30/2021       Meds:     Current Outpatient Medications:     aspirin (ECOTRIN) 81 MG EC tablet, Adult Low Dose Aspirin 81 mg tablet,delayed release  Take 1 tablet every day by oral route., Disp: , Rfl:     atorvastatin (LIPITOR) 80 MG tablet, Take 80 mg by mouth once daily., Disp: , Rfl:     b complex vitamins (B COMPLEX-VITAMIN B12) tablet, Take 1 tablet by mouth once daily., Disp: 90 tablet, Rfl: 1    BRILINTA 90 mg tablet, 90 mg 2 (two) times daily., Disp: , Rfl:     bumetanide (BUMEX) 0.5 MG Tab, TAKE 1 TABLET BY MOUTH ONCE A DAY, Disp: 90 tablet, Rfl: 3    empagliflozin (JARDIANCE) 25 mg tablet, Take 1 tablet (25 mg total) by mouth once daily., Disp: 90 tablet, Rfl: 1    ergocalciferol (ERGOCALCIFEROL) 50,000 unit Cap, Take 1 capsule by mouth once a week, Disp: 12 capsule, Rfl: 0    metFORMIN (GLUCOPHAGE-XR) 500 MG ER 24hr tablet, Take 1 tablet (500 mg total) by mouth daily with breakfast., Disp: 90 tablet, Rfl: 3    metoprolol succinate (TOPROL-XL) 100 MG 24 hr tablet, Take 1 tablet (100 mg total) by mouth 2 (two) times daily., Disp: 60 tablet, Rfl: 0    ondansetron (ZOFRAN-ODT) 8 MG TbDL, ondansetron 8 mg disintegrating tablet  DISSOLVE 1 TABLET IN MOUTH TWICE DAILY AS NEEDED, Disp: , Rfl:     pantoprazole (PROTONIX) 40 MG tablet, Take 40 mg by mouth once daily., Disp: , Rfl:     ranolazine (RANEXA) 500 MG Tb12, Take 500 mg by mouth 2 (two) times daily., Disp: , Rfl:     sacubitriL-valsartan (ENTRESTO)  mg per tablet, Take 1 tablet by mouth 2 (two) times daily., Disp: 90 tablet, Rfl: 3    spironolactone (ALDACTONE) 25 MG tablet, Take 1 tablet (25 mg total) by mouth once daily., Disp: 90 tablet, Rfl: 3    temazepam (RESTORIL) 15 mg Cap, Take 15 mg by mouth every evening., Disp: , Rfl:       Assessment & Plan:     Coronary artery disease  C diagnostic  Right radial

## 2023-08-22 NOTE — PROGRESS NOTES
OUTPATIENT CATHETERIZATION INSTRUCTIONS    You have been scheduled for a procedure in the catheterization lab on Friday, September 29, 2023.     Please report to the Cardiology Waiting Area on the Third floor of the hospital and check in at 10:30 AM.   You will then be taken to the SSCU (Short Stay Cardiac Unit) and prepared for your procedure. Please be aware that this is not the time of your procedure but the time you are to arrive. The procedures are scheduled on an hourly basis; however, emergency cases take precedence over all other cases.       No solid foods 8 hours prior to your procedure.  You may have clear liquids until the time of your admission which should be 2 hours prior to your procedure.  You are encouraged to drink at least 8 ounces of clear liquids prior to your admission to SSCU.  Patients with gastric emptying issues should be fasting for 6- 8 hours prior to the procedure.  Clear liquids include water, black coffee, clear juices, and performance drinks - no pulp or milk.    Heart failure or dialysis patients will be limited to 8 ounces (1 cup) of clear liquids up until 2 hours of the procedure.    3.   You may take your regular morning medications with water. If there are any medications that you should not take you will be instructed to hold them that morning. If you         are diabetic and on Metformin (Glucophage) do not take it the day before, the day of, and for 2 days after your procedure.  4.   If you are on Pradaxa, Eliquis or Coumadin , you can hold them 3 days prior to your procedure.      The procedure will take 1-2 hours to perform. After the procedure, you will return to SSCU on the third floor of the hospital. You will need to lie still (or keep your arm still) for the next 2 to 4 hours to minimize bleeding from the puncture site.  This time is determined by your physician.  Your family may remain in the room with you during this time.       You may be able to be discharged home  "that same afternoon if there is someone to drive you home and there are no complications.  Your doctor will determine, based on your progress, the date and time of your discharge. The results of your procedure will be discussed with you before you are discharged. Any further testing or procedures will be scheduled for you either before you leave or after your discharge..       If you should have any questions, concerns, or need to change the date of your procedure, please call "JORDON Araiza @ [phone]            Special Instructions:    Stop taking your Metformin (Glucophage) on Wednesday, September 27, 2023.  You will resume your medication on Monday October 2 , 2023.    Continue to take your Aspirin and Brilinta.      THE ABOVE INSTRUCTIONS WERE GIVEN TO THE PATIENT VERBALLY AND THEY VERBALIZED UNDERSTANDING.  THEY DO NOT REQUIRE ANY SPECIAL NEEDS AND DO NOT HAVE ANY LEARNING BARRIERS.          Directions for Reporting to Cardiology Waiting Area in the Hospital  If you park in the Parking Garage:  Take elevators to the1st floor of the parking garage.  Continue past the gift shop, coffee shop, and piano.  Take a right and go to the gold elevators. (Elevator B)  Take the elevator to the 3rd floor.  Follow the arrow on the sign on the wall that says Cath Lab Registration/EP Lab Registration.  Follow the long hallway all the way around until you come to a big open area.  This is the registration area.  Check in at Reception Desk.    OR    If family is dropping you off:  Have them drop you off at the front of the Hospital under the green overhang.  Enter through the doors and take a right.  Take the E elevators to the 3rd floor Cardiology Waiting Area.  Check in at the Reception Desk in the waiting room.              "

## 2023-09-12 ENCOUNTER — PATIENT MESSAGE (OUTPATIENT)
Dept: CARDIOLOGY | Facility: CLINIC | Age: 68
End: 2023-09-12
Payer: MEDICARE

## 2023-09-29 ENCOUNTER — HOSPITAL ENCOUNTER (OUTPATIENT)
Facility: HOSPITAL | Age: 68
Discharge: HOME | End: 2023-09-29
Attending: INTERNAL MEDICINE | Admitting: INTERNAL MEDICINE
Payer: MEDICARE

## 2023-09-29 VITALS
SYSTOLIC BLOOD PRESSURE: 131 MMHG | DIASTOLIC BLOOD PRESSURE: 70 MMHG | OXYGEN SATURATION: 97 % | HEIGHT: 64 IN | HEART RATE: 70 BPM | WEIGHT: 198 LBS | TEMPERATURE: 98 F | BODY MASS INDEX: 33.8 KG/M2 | RESPIRATION RATE: 18 BRPM

## 2023-09-29 DIAGNOSIS — I42.9 CARDIOMYOPATHY, UNSPECIFIED TYPE: ICD-10-CM

## 2023-09-29 DIAGNOSIS — I25.83 CORONARY ARTERY DISEASE DUE TO LIPID RICH PLAQUE: Primary | ICD-10-CM

## 2023-09-29 DIAGNOSIS — I50.20 HEART FAILURE WITH REDUCED EJECTION FRACTION: ICD-10-CM

## 2023-09-29 DIAGNOSIS — R06.09 DYSPNEA ON EXERTION: ICD-10-CM

## 2023-09-29 DIAGNOSIS — R94.39 ABNORMAL CARDIOVASCULAR STRESS TEST: ICD-10-CM

## 2023-09-29 DIAGNOSIS — I25.10 CORONARY ARTERY DISEASE DUE TO LIPID RICH PLAQUE: Primary | ICD-10-CM

## 2023-09-29 DIAGNOSIS — Z98.890 STATUS POST LEFT HEART CATHETERIZATION: ICD-10-CM

## 2023-09-29 LAB
ABO + RH BLD: NORMAL
BLD GP AB SCN CELLS X3 SERPL QL: NORMAL

## 2023-09-29 PROCEDURE — 25000003 PHARM REV CODE 250: Performed by: INTERNAL MEDICINE

## 2023-09-29 PROCEDURE — 99152 MOD SED SAME PHYS/QHP 5/>YRS: CPT | Performed by: INTERNAL MEDICINE

## 2023-09-29 PROCEDURE — 93458 L HRT ARTERY/VENTRICLE ANGIO: CPT | Performed by: INTERNAL MEDICINE

## 2023-09-29 PROCEDURE — 93010 ELECTROCARDIOGRAM REPORT: CPT | Mod: ,,, | Performed by: INTERNAL MEDICINE

## 2023-09-29 PROCEDURE — C1887 CATHETER, GUIDING: HCPCS | Performed by: INTERNAL MEDICINE

## 2023-09-29 PROCEDURE — 99152 MOD SED SAME PHYS/QHP 5/>YRS: CPT | Mod: GC,,, | Performed by: INTERNAL MEDICINE

## 2023-09-29 PROCEDURE — 86901 BLOOD TYPING SEROLOGIC RH(D): CPT | Performed by: INTERNAL MEDICINE

## 2023-09-29 PROCEDURE — 93010 EKG 12-LEAD: ICD-10-PCS | Mod: ,,, | Performed by: INTERNAL MEDICINE

## 2023-09-29 PROCEDURE — 93458 L HRT ARTERY/VENTRICLE ANGIO: CPT | Mod: 26,GC,, | Performed by: INTERNAL MEDICINE

## 2023-09-29 PROCEDURE — 36415 COLL VENOUS BLD VENIPUNCTURE: CPT | Performed by: INTERNAL MEDICINE

## 2023-09-29 PROCEDURE — 93458 PR CATH PLACE/CORON ANGIO, IMG SUPER/INTERP,W LEFT HEART VENTRICULOGRAPHY: ICD-10-PCS | Mod: 26,GC,, | Performed by: INTERNAL MEDICINE

## 2023-09-29 PROCEDURE — 25500020 PHARM REV CODE 255: Performed by: INTERNAL MEDICINE

## 2023-09-29 PROCEDURE — 63600175 PHARM REV CODE 636 W HCPCS: Performed by: INTERNAL MEDICINE

## 2023-09-29 PROCEDURE — 63600175 PHARM REV CODE 636 W HCPCS: Performed by: STUDENT IN AN ORGANIZED HEALTH CARE EDUCATION/TRAINING PROGRAM

## 2023-09-29 PROCEDURE — C1894 INTRO/SHEATH, NON-LASER: HCPCS | Performed by: INTERNAL MEDICINE

## 2023-09-29 PROCEDURE — 93005 ELECTROCARDIOGRAM TRACING: CPT

## 2023-09-29 PROCEDURE — 99153 MOD SED SAME PHYS/QHP EA: CPT | Performed by: INTERNAL MEDICINE

## 2023-09-29 PROCEDURE — 99152 PR MOD CONSCIOUS SEDATION, SAME PHYS, 5+ YRS, FIRST 15 MIN: ICD-10-PCS | Mod: GC,,, | Performed by: INTERNAL MEDICINE

## 2023-09-29 PROCEDURE — C1769 GUIDE WIRE: HCPCS | Performed by: INTERNAL MEDICINE

## 2023-09-29 RX ORDER — DIPHENHYDRAMINE HCL 50 MG
50 CAPSULE ORAL ONCE
Status: COMPLETED | OUTPATIENT
Start: 2023-09-29 | End: 2023-09-29

## 2023-09-29 RX ORDER — FENTANYL CITRATE 50 UG/ML
INJECTION, SOLUTION INTRAMUSCULAR; INTRAVENOUS
Status: DISCONTINUED | OUTPATIENT
Start: 2023-09-29 | End: 2023-09-29 | Stop reason: HOSPADM

## 2023-09-29 RX ORDER — ACETAMINOPHEN 325 MG/1
650 TABLET ORAL EVERY 4 HOURS PRN
Status: DISCONTINUED | OUTPATIENT
Start: 2023-09-29 | End: 2023-09-29 | Stop reason: HOSPADM

## 2023-09-29 RX ORDER — SODIUM CHLORIDE 9 MG/ML
INJECTION, SOLUTION INTRAVENOUS CONTINUOUS
Status: ACTIVE | OUTPATIENT
Start: 2023-09-29

## 2023-09-29 RX ORDER — ONDANSETRON 8 MG/1
8 TABLET, ORALLY DISINTEGRATING ORAL EVERY 8 HOURS PRN
Status: DISCONTINUED | OUTPATIENT
Start: 2023-09-29 | End: 2023-09-29 | Stop reason: HOSPADM

## 2023-09-29 RX ORDER — LIDOCAINE HYDROCHLORIDE 20 MG/ML
INJECTION, SOLUTION INFILTRATION; PERINEURAL
Status: DISCONTINUED | OUTPATIENT
Start: 2023-09-29 | End: 2023-09-29 | Stop reason: HOSPADM

## 2023-09-29 RX ORDER — SODIUM CHLORIDE 9 MG/ML
INJECTION, SOLUTION INTRAVENOUS CONTINUOUS
Status: ACTIVE | OUTPATIENT
Start: 2023-09-29 | End: 2023-09-29

## 2023-09-29 RX ORDER — HEPARIN SODIUM 1000 [USP'U]/ML
INJECTION, SOLUTION INTRAVENOUS; SUBCUTANEOUS
Status: DISCONTINUED | OUTPATIENT
Start: 2023-09-29 | End: 2023-09-29 | Stop reason: HOSPADM

## 2023-09-29 RX ORDER — MIDAZOLAM HYDROCHLORIDE 1 MG/ML
INJECTION INTRAMUSCULAR; INTRAVENOUS
Status: DISCONTINUED | OUTPATIENT
Start: 2023-09-29 | End: 2023-09-29 | Stop reason: HOSPADM

## 2023-09-29 RX ORDER — HEPARIN SOD,PORCINE/0.9 % NACL 1000/500ML
INTRAVENOUS SOLUTION INTRAVENOUS
Status: DISCONTINUED | OUTPATIENT
Start: 2023-09-29 | End: 2023-09-29 | Stop reason: HOSPADM

## 2023-09-29 RX ADMIN — DIPHENHYDRAMINE HYDROCHLORIDE 50 MG: 50 CAPSULE ORAL at 11:09

## 2023-09-29 RX ADMIN — SODIUM CHLORIDE: 9 INJECTION, SOLUTION INTRAVENOUS at 11:09

## 2023-09-29 NOTE — H&P
Lili Gerardo is a 68 y.o. y.o. female who presents for evaluation Coronary Artery Disease        HPI three-vessel severe coronary artery disease with an ejection fraction of 20%, nonsurgical candidate, extremely high risk coronary angioplasty candidate who I personally turned down after seeing the patient 3 times in clinic recently looking at films performed in 2022 at [facility]  A schedule patient to see heart failure transplant service and repeat echocardiogram after optimal guideline directed medical therapy was instituted reveals an ejection fraction of 55%.  After long discussion with the patient and her daughter we all feel that it is prudent to perform repeat coronary angiography, repeat surgical evaluation and repeat interventional cardiology evaluation.  I plan to sign patient up for a diagnostic left heart catheterization through the right radial artery.     ROS  Patient's symptoms have improved markedly and she can now walk further than the parking garage to my clinic and she can buy groceries.    Past Medical History:   Diagnosis Date    Acid reflux     Coronary artery disease     Diabetes     Heart murmur     Hypertension       Past Surgical History:   Procedure Laterality Date    CORONARY STENT PLACEMENT      GALLBLADDER SURGERY      removed      There were no vitals filed for this visit.     Gen awake and alert  Neck supple   Heart distant heart sounds due to body habitus, no appreciated M  Lungs CTA B  Abdomen soft non tender  Ext no edema       Component      Latest Ref Rng 9/27/2023   Sodium      136 - 145 mmol/L 139    Potassium      3.5 - 5.1 mmol/L 4.9    Chloride      95 - 110 mmol/L 105    CO2      23 - 29 mmol/L 24    Glucose      70 - 110 mg/dL 197 (H)    BUN      8 - 23 mg/dL 34 (H)    Creatinine      0.5 - 1.4 mg/dL 1.3    Calcium      8.7 - 10.5 mg/dL 9.5    PROTEIN TOTAL      6.0 - 8.4 g/dL 7.9    Albumin      3.5 - 5.2 g/dL 3.5    BILIRUBIN TOTAL      0.1 - 1.0 mg/dL 0.6     Alkaline Phosphatase      55 - 135 U/L 64    AST      10 - 40 U/L 13    ALT      10 - 44 U/L 15    eGFR      >60 mL/min/1.73 m^2 45.4 !    Anion Gap      8 - 16 mmol/L 10    Protime      9.0 - 12.5 sec 11.0    INR      0.8 - 1.2  1.0       Assessment and plan     _ 67 yo Female. Hx of severely reduced EF , but most recent Echo showed EF of 55% and three vessel coronary artery disease   PET scan in 2022, reported 20% ischemia in the LAD territory and 37% in the inferolateral and inferior territory   _ Plan for diagnostic angiogram today   _ Access R radial   _ Antiplatelet Aspirin alone  _ Risks and benefits were explained, all questions were answered    Shahana Barnes MD  Interventional Cardiology Fellow

## 2023-09-29 NOTE — DISCHARGE SUMMARY
Discharge Summary  Interventional Cardiology      Admit Date: 9/29/2023    Discharge Date:  9/29/2023    Attending Physician: Manoj Clay MD    Discharge Physician: Shahana Barnes MD    Principal Diagnoses: <principal problem not specified>  Indication for Admission: Angiogram, Coronary, with Left Heart Cath (Left)    Discharged Condition: Good    Hospital Course:   Patient presented for outpatient coronary angiogram which went without complication. Coronary angiogram revealed  of the LAD and RCA . See full cath report in Epic for details. Hemostasis of patient's R Radial access site was achieved with VascBand. Patient was monitored per post-cath protocol, and her R radial access site was c/d/i with no hematoma. Patient was able to ambulate without difficulty. She was feeling well and anticipating discharge home today.     Outpatient Plan:  - There were no medication changes  - Continue Aspirin 81 mg daily   - Continue Ticagrelor 90 mg PO BID   - Patient would benefit from long term DAPT as she has a stent in the only vessel to the heart   - Patient will be evaluated by CTS to evaluate for possible CABG  - Hold Metformin for 24-48 hours     Diet: Cardiac diet    Activity: Ad arleth, wound care instructions provided    Disposition: Home or Self Care    Follow Up: with Dr Clay and Dr Patel       Discharge Medications:      Medication List        CONTINUE taking these medications      aspirin 81 MG EC tablet  Commonly known as: ECOTRIN     atorvastatin 80 MG tablet  Commonly known as: LIPITOR     b complex vitamins tablet  Commonly known as: B COMPLEX-VITAMIN B12  Take 1 tablet by mouth once daily.     BRILINTA 90 mg tablet  Generic drug: ticagrelor     bumetanide 0.5 MG Tab  Commonly known as: BUMEX  TAKE 1 TABLET BY MOUTH ONCE A DAY     empagliflozin 25 mg tablet  Commonly known as: JARDIANCE  Take 1 tablet (25 mg total) by mouth once daily.     ENTRESTO  mg per tablet  Generic drug:  sacubitriL-valsartan  Take 1 tablet by mouth 2 (two) times daily.     ergocalciferol 50,000 unit Cap  Commonly known as: ERGOCALCIFEROL  Take 1 capsule by mouth once a week     metFORMIN 500 MG ER 24hr tablet  Commonly known as: GLUCOPHAGE-XR  Take 1 tablet (500 mg total) by mouth daily with breakfast.     metoprolol succinate 100 MG 24 hr tablet  Commonly known as: TOPROL-XL  Take 1 tablet (100 mg total) by mouth 2 (two) times daily.     ondansetron 8 MG Tbdl  Commonly known as: ZOFRAN-ODT     pantoprazole 40 MG tablet  Commonly known as: PROTONIX     ranolazine 500 MG Tb12  Commonly known as: RANEXA     spironolactone 25 MG tablet  Commonly known as: ALDACTONE  Take 1 tablet (25 mg total) by mouth once daily.     temazepam 15 mg Cap  Commonly known as: RESTORIL

## 2023-09-29 NOTE — Clinical Note
The catheter was removed from the ostium   left main. An angiography was performed of the left coronary arteries. Multiple views were taken.

## 2023-09-29 NOTE — PLAN OF CARE
Received report from JORDON Jarrell. Patient s/p [facility], AAOx3. VSS, no c/o pain or discomfort at this time, resp even and unlabored. Vasc band to R wrist is CDI. No active bleeding. No hematoma noted. Post procedure protocol reviewed with patient and patient's family. Understanding verbalized. Family members at bedside. Nurse call bell within reach. Will continue to monitor per post procedure protocol.

## 2023-09-29 NOTE — OP NOTE
Brief Operative Note:    : Manoj Clay MD     Referring Physician: Mahogany Arias     All Operators: Surgeon(s):  Ascencion Garcia MD Bagh, Imad, MD Jenkins, James S., MD     Preoperative Diagnosis: Heart failure with reduced ejection fraction [I50.20]  Abnormal cardiovascular stress test [R94.39]  Dyspnea on exertion [R06.00]  Cardiomyopathy, unspecified type [I42.9]     Postop Diagnosis: Heart failure with reduced ejection fraction [I50.20]  Abnormal cardiovascular stress test [R94.39]  Dyspnea on exertion [R06.00]  Cardiomyopathy, unspecified type [I42.9]    Treatments/Procedures: Procedure(s) (LRB):  Angiogram, Coronary, with Left Heart Cath (Left)    Access: Right radial artery    Findings:Severe coronary artery disease is present.     See catheterization report for full details.    Intervention: none     See catheterization report for full details.    Closure device:  Radial band         Plan:  - Post cath protocol   - IVF @ 150 cc/hr x 3 hours  - Bed rest x 2 hours   - Continue aspirin 81 mg daily indefinitely  - Continue high intensity statin therapy (LDL goal < 70)  - Risk factor reduction (BP <130/80 mmHg, glycemic control, etc)  - Follow up with outpatient cardiologist    Estimated Blood loss: 20 cc    Specimens removed: No    Ascencion Garcia MD  [facility]  Cardiovascular Disease, PGY-VI

## 2023-09-29 NOTE — HPI
Patient is a 69 y/o female with medical conditions that include CAD s/p MI in 1/2022 s/p LHC with PCI in [facility] with [facility] (balloon angioplasty to the CX, and YESICA to OM1), iCM with EF 25%, CKD3, HTN, DMII, obesity who presents as outpatient for repeat LHC.    She was seen by Dr. Talbot in January 2023 for CABG evaluation, at the time      three-vessel severe coronary artery disease with an ejection fraction of 20%, nonsurgical candidate, extremely high risk coronary angioplasty that was turned down 3 times in IC clinic recently looking at films performed in 2022 at [facility].  She is followed by heart failure transplant service and repeat echocardiogram after optimal guideline directed medical therapy was instituted reveals an ejection fraction of 55%.  LHC today significant for multivessel disease

## 2023-09-29 NOTE — PLAN OF CARE
Patient discharged per MD orders. Instructions given on medications, wound care, activity, signs of infection, when to call MD, and follow up appointments. Pt verbalized understanding. Telemetry and PIV removed. Patient and family used wc to private vehicle.

## 2023-10-02 ENCOUNTER — TELEPHONE (OUTPATIENT)
Dept: CARDIOTHORACIC SURGERY | Facility: CLINIC | Age: 68
End: 2023-10-02
Payer: MEDICARE

## 2023-10-02 LAB — POCT GLUCOSE: 238 MG/DL (ref 70–110)

## 2023-10-02 NOTE — TELEPHONE ENCOUNTER
Called pt to schedule second opinion consult with Dr. Patel per Dr. Clay' request. Spoke with pt and her daughter, who will provide transportation. Pt and her daughter verbalized understanding of appointment date, time, and location.

## 2023-10-24 ENCOUNTER — OFFICE VISIT (OUTPATIENT)
Dept: CARDIOTHORACIC SURGERY | Facility: CLINIC | Age: 68
End: 2023-10-24
Payer: MEDICARE

## 2023-10-24 VITALS
BODY MASS INDEX: 34.6 KG/M2 | OXYGEN SATURATION: 94 % | SYSTOLIC BLOOD PRESSURE: 117 MMHG | HEIGHT: 64 IN | HEART RATE: 79 BPM | DIASTOLIC BLOOD PRESSURE: 62 MMHG | WEIGHT: 202.69 LBS

## 2023-10-24 DIAGNOSIS — I25.10 CORONARY ARTERY DISEASE INVOLVING NATIVE CORONARY ARTERY OF NATIVE HEART, UNSPECIFIED WHETHER ANGINA PRESENT: Primary | ICD-10-CM

## 2023-10-24 PROCEDURE — 99999 PR PBB SHADOW E&M-EST. PATIENT-LVL III: ICD-10-PCS | Mod: PBBFAC,,, | Performed by: THORACIC SURGERY (CARDIOTHORACIC VASCULAR SURGERY)

## 2023-10-24 PROCEDURE — 99999 PR PBB SHADOW E&M-EST. PATIENT-LVL III: CPT | Mod: PBBFAC,,, | Performed by: THORACIC SURGERY (CARDIOTHORACIC VASCULAR SURGERY)

## 2023-10-24 PROCEDURE — 99214 OFFICE O/P EST MOD 30 MIN: CPT | Mod: S$PBB,,, | Performed by: THORACIC SURGERY (CARDIOTHORACIC VASCULAR SURGERY)

## 2023-10-24 PROCEDURE — 99214 PR OFFICE/OUTPT VISIT, EST, LEVL IV, 30-39 MIN: ICD-10-PCS | Mod: S$PBB,,, | Performed by: THORACIC SURGERY (CARDIOTHORACIC VASCULAR SURGERY)

## 2023-10-24 PROCEDURE — 99213 OFFICE O/P EST LOW 20 MIN: CPT | Mod: PBBFAC | Performed by: THORACIC SURGERY (CARDIOTHORACIC VASCULAR SURGERY)

## 2023-10-24 NOTE — PROGRESS NOTES
Subjective:      Patient ID: Lili Gerardo is a 68 y.o. female.    Chief Complaint: No chief complaint on file.      HPI:  Lili Gerardo is a 68 y.o. female who presents a surgical evaluation for CAD. Medical conditions include CAD s/p MI in 1/2022 s/p LHC with PCI in [facility] with [facility] (balloon angioplasty to the CX, and YESICA to OM1), iCM with EF 25%, CKD3, HTN, DMII, obesity. she has had known 3 vessel severe coronary artery disease with an ejection fraction of 20%, nonsurgical candidate, extremely high risk coronary angioplasty candidate who was turned down 3 times by interventional cardiology in clinic recently looking at films performed in 2022 at [facility]. Patient is followed by heart transplant service and repeat echocardiogram after optimal guideline directed medical therapy was instituted reveals an ejection fraction of 55%. Patient underwent repeat LHC for reevaluation which revealed revealed  of the LAD and RCA.     Of note, patient was evaluated by Dr. Abdalla for CABG in January 2023 for which he does not believe her LAD  and RCA is large enough for bypass, recommend PCI as bridge to see if her EF improves is a good idea. She was asymptomatic at the time . He also notes there is no LAD to bypass the benefit of surgery is minimal and if she ends up going down the path of transplant or LVAD having a virgin sternum would be helpful. Patient EF at the time was 25%.     Current medications Reviewed  Current Outpatient Medications on File Prior to Visit   Medication Sig Dispense Refill    aspirin (ECOTRIN) 81 MG EC tablet Adult Low Dose Aspirin 81 mg tablet,delayed release   Take 1 tablet every day by oral route.      atorvastatin (LIPITOR) 80 MG tablet Take 80 mg by mouth once daily.      b complex vitamins (B COMPLEX-VITAMIN B12) tablet Take 1 tablet by mouth once daily. 90 tablet 1    BRILINTA 90 mg tablet 90 mg 2 (two) times daily.      bumetanide (BUMEX) 0.5 MG Tab TAKE 1 TABLET BY  MOUTH ONCE A DAY 90 tablet 3    empagliflozin (JARDIANCE) 25 mg tablet Take 1 tablet (25 mg total) by mouth once daily. 90 tablet 1    ergocalciferol (ERGOCALCIFEROL) 50,000 unit Cap Take 1 capsule by mouth once a week 12 capsule 0    metFORMIN (GLUCOPHAGE-XR) 500 MG ER 24hr tablet Take 1 tablet (500 mg total) by mouth daily with breakfast. 90 tablet 3    metoprolol succinate (TOPROL-XL) 100 MG 24 hr tablet Take 1 tablet (100 mg total) by mouth 2 (two) times daily. 60 tablet 0    ondansetron (ZOFRAN-ODT) 8 MG TbDL ondansetron 8 mg disintegrating tablet   DISSOLVE 1 TABLET IN MOUTH TWICE DAILY AS NEEDED      pantoprazole (PROTONIX) 40 MG tablet Take 40 mg by mouth once daily.      ranolazine (RANEXA) 500 MG Tb12 Take 500 mg by mouth 2 (two) times daily.      sacubitriL-valsartan (ENTRESTO)  mg per tablet Take 1 tablet by mouth 2 (two) times daily. 90 tablet 3    spironolactone (ALDACTONE) 25 MG tablet Take 1 tablet (25 mg total) by mouth once daily. 90 tablet 3    temazepam (RESTORIL) 15 mg Cap Take 15 mg by mouth every evening.      [DISCONTINUED] pravastatin (PRAVACHOL) 20 MG tablet Take 1 tablet (20 mg total) by mouth once daily. 90 tablet 3     Current Facility-Administered Medications on File Prior to Visit   Medication Dose Route Frequency Provider Last Rate Last Admin    0.9%  NaCl infusion   Intravenous Continuous Manoj Clay  mL/hr at 09/29/23 1108 New Bag at 09/29/23 1108       Review of Systems   Constitutional:  Negative for activity change, appetite change, fatigue and fever.   HENT:  Negative for nosebleeds.    Respiratory:  Negative for cough and shortness of breath.    Cardiovascular:  Negative for chest pain, palpitations and leg swelling.   Gastrointestinal:  Negative for abdominal distention, abdominal pain and nausea.   Genitourinary:  Negative for frequency.   Musculoskeletal:  Negative for arthralgias and myalgias.   Skin:  Negative for rash.   Neurological:  Negative for  dizziness and numbness.   Hematological:  Does not bruise/bleed easily.     Objective:   Physical Exam  Constitutional:       Appearance: Normal appearance.   HENT:      Head: Normocephalic and atraumatic.   Eyes:      Extraocular Movements: Extraocular movements intact.   Cardiovascular:      Rate and Rhythm: Normal rate.   Pulmonary:      Effort: Pulmonary effort is normal.   Abdominal:      General: Abdomen is flat.      Palpations: Abdomen is soft.   Musculoskeletal:         General: Normal range of motion.      Cervical back: Normal range of motion.   Skin:     General: Skin is warm and dry.      Capillary Refill: Capillary refill takes less than 2 seconds.      Coloration: Skin is not pale.   Neurological:      General: No focal deficit present.      Mental Status: She is alert.         Diagnotic Results: reviewed   [facility] 10/2023    The Mid RCA to Dist RCA lesion was 100% stenosed.    The Mid LAD to Dist LAD lesion was 100% stenosed.    The Mid Cx to Dist Cx lesion was 100% stenosed.    The pre-procedure left ventricular end diastolic pressure was 17.    The estimated blood loss was none.    There was three vessel coronary artery disease -- CABG.    Follow up with CTS.    TTE 5/2/23  The left ventricle is normal in size with normal systolic function. The estimated ejection fraction is 55%.  Normal right ventricular size with normal right ventricular systolic function.  Indeterminate left ventricular diastolic function.  Mild left atrial enlargement.  The estimated PA systolic pressure is 27 mmHg.  Normal central venous pressure (3 mmHg).    [facility] images reviewed by Dr. Abdalla  30-40% LM, 90%prox LAD occluded mid with distal collateral filling, D1 80-90%, 95% CX, 95% OM1, RCA occluded proximally with large RV branch giving collaterals to mid-distal LAD. She did undergo balloon angioplasty to the CX, and YESICA to OM1 and unable to intervene on LAD noted as .     6 Minute Walk 6/15/22  Six minute walk distance is  243.84 meters (800 feet) with very light dyspnea.  During exercise, there was no significant desaturation while breathing room air.  Both blood pressure and heart rate remained stable with walking.  The patient reported non-pulmonary symptoms during exercise.  Significant exercise impairment is likely due to subjective symptoms.  No previous study performed.     Cardiac PET 10/18/22    There are two significant perfusion abnormalities.    Perfusion Abnormality #1 - There is a very small (< 5%) sized, mild intensity mid to apical anterior and anteroseptal resting perfusion abnormality involving 5% of LV myocardium in the distribution of the LAD. After pharmacologic stress, this perfusion abnormality is larger and more severe involving 20% of the LV myocardium, indicative of ischemia with underlying scar.    Perfusion Abnormality #2 - There is a very small (<5%) sized, mild intensity basal to apical inferior and inferolateral resting perfusion abnormality involving 2% of LV myocardium in the distribution of the LCX and RCA territory. After pharmacologic stress, this perfusion abnormality is larger and more severe involving 37% of the LV myocardium indicative of ischemia with underlying infarct.    There are no other significant perfusion abnormalities.    The whole heart absolute myocardial perfusion values averaged 0.43 cc/min/g at rest, which is reduced; 0.61 cc/min/g at stress, which is severely reduced; and CFR is 1.37 , which is moderately reduced.    CT attenuation images demonstrate severe diffuse coronary calcifications and mild diffuse aortic calcifications.    The gated perfusion images showed an ejection fraction of 40% at rest and 36% during stress. A normal ejection fraction is greater than 53%.    There is mild global hypokinesis at rest and moderate global hypokinesis during stress .    The LV cavity size is normal at rest and mildly enlarged at stress.    The EKG portion of this study is negative for  ischemia.    There were no arrhythmias during stress.    The patient reported no chest pain during the stress test.    There are no prior studies for comparison.     This is a high risk study with reversible perfusion abnormalities and severely reduced coronary flow capacity in a multivessel distribution involving 60% of the ventricular myocardium. Ischemic dilatation of the LV is present.       Assessment:   CAD   Plan:     CTS Attending Note:    I have personally taken the history and examined this patient and agree with the WM's note as stated above.  Very pleasant 68-year-old woman with ischemic cardiomyopathy.  Her ventricular function has improved markedly with GDMT.  I saw her in clinic, and discussed her situation with her and her family member in detail.  Prior to that, I reviewed her angiogram.  She has diffuse disease in the left anterior descending.  I would not recommend surgical revascularization.  Recommend ongoing medical management with or without percutaneous coronary intervention.

## 2023-10-24 NOTE — LETTER
October 24, 2023        Manoj Clay MD  [address]  [facility] [address]             [facility] 2nd Fl  [address]  [facility] [address]  Phone: [phone]   Patient: Llii Gerardo   MR Number: [id number]   YOB: 1955   Date of Visit: 10/24/2023       Dear Dr. Clay:    Thank you for referring Lili Gerardo to me for evaluation. Below are the relevant portions of my assessment and plan of care.            If you have questions, please do not hesitate to call me. I look forward to following Lili along with you.    Sincerely,      Omar Patel MD           CC  No Recipients

## (undated) DEVICE — PAD DEFIB CADENCE ADULT R2

## (undated) DEVICE — COVER INSTR ELASTIC BAND 40X20

## (undated) DEVICE — KIT GLIDESHEATH SLEND 6FR 10CM

## (undated) DEVICE — GUIDEWIRE STD .035X180CM ANG

## (undated) DEVICE — OMNIPAQUE 350 200ML

## (undated) DEVICE — OMNIPAQUE 300MG 50ML

## (undated) DEVICE — KIT CUSTOM MANIFOLD

## (undated) DEVICE — TRANSDUCER ADULT DISP

## (undated) DEVICE — SPIKE SHORT LG BORE 1-WAY 2IN

## (undated) DEVICE — VISE RADIFOCUS MULTI TORQUE

## (undated) DEVICE — WIRE BENTSON 035/180

## (undated) DEVICE — HEMOSTAT VASC BAND REG 24CM

## (undated) DEVICE — DRAPE ANGIO BRACH 38X44IN

## (undated) DEVICE — CATH JACKY RADIAL 5FR 100CM

## (undated) DEVICE — COVER PROBE US 5.5X58L NON LTX

## (undated) DEVICE — TRAY CATH LAB OMC

## (undated) DEVICE — GUIDEWIRE EMERALD .035IN 260CM